# Patient Record
Sex: MALE | Race: BLACK OR AFRICAN AMERICAN | NOT HISPANIC OR LATINO | Employment: FULL TIME | URBAN - METROPOLITAN AREA
[De-identification: names, ages, dates, MRNs, and addresses within clinical notes are randomized per-mention and may not be internally consistent; named-entity substitution may affect disease eponyms.]

---

## 2019-01-02 ENCOUNTER — HOSPITAL ENCOUNTER (INPATIENT)
Facility: HOSPITAL | Age: 24
LOS: 9 days | Discharge: HOME | DRG: 558 | End: 2019-01-11
Attending: EMERGENCY MEDICINE | Admitting: EMERGENCY MEDICINE
Payer: OTHER GOVERNMENT

## 2019-01-02 DIAGNOSIS — M62.82 NON-TRAUMATIC RHABDOMYOLYSIS: Primary | ICD-10-CM

## 2019-01-02 DIAGNOSIS — M79.10 MYALGIA: ICD-10-CM

## 2019-01-02 LAB
ALBUMIN SERPL BCP-MCNC: 4.4 G/DL
ALP SERPL-CCNC: 69 U/L
ALT SERPL W/O P-5'-P-CCNC: 457 U/L
AMPHET+METHAMPHET UR QL: NEGATIVE
ANION GAP SERPL CALC-SCNC: 10 MMOL/L
APAP SERPL-MCNC: <3 UG/ML
AST SERPL-CCNC: 1770 U/L
BACTERIA #/AREA URNS HPF: NORMAL /HPF
BARBITURATES UR QL SCN>200 NG/ML: NEGATIVE
BASOPHILS # BLD AUTO: 0.01 K/UL
BASOPHILS NFR BLD: 0.1 %
BENZODIAZ UR QL SCN>200 NG/ML: NEGATIVE
BILIRUB SERPL-MCNC: 0.6 MG/DL
BILIRUB UR QL STRIP: NEGATIVE
BUN SERPL-MCNC: 23 MG/DL
BZE UR QL SCN: NEGATIVE
CALCIUM SERPL-MCNC: 9.9 MG/DL
CANNABINOIDS UR QL SCN: NEGATIVE
CHLORIDE SERPL-SCNC: 102 MMOL/L
CK SERPL-CCNC: ABNORMAL U/L
CLARITY UR: CLEAR
CO2 SERPL-SCNC: 27 MMOL/L
COLOR UR: ABNORMAL
CREAT SERPL-MCNC: 1.2 MG/DL
CREAT UR-MCNC: 155.6 MG/DL
DIFFERENTIAL METHOD: NORMAL
EOSINOPHIL # BLD AUTO: 0.3 K/UL
EOSINOPHIL NFR BLD: 3.3 %
ERYTHROCYTE [DISTWIDTH] IN BLOOD BY AUTOMATED COUNT: 12.4 %
EST. GFR  (AFRICAN AMERICAN): >60 ML/MIN/1.73 M^2
EST. GFR  (NON AFRICAN AMERICAN): >60 ML/MIN/1.73 M^2
GLUCOSE SERPL-MCNC: 89 MG/DL
GLUCOSE UR QL STRIP: NEGATIVE
HCT VFR BLD AUTO: 40.6 %
HGB BLD-MCNC: 14.1 G/DL
HGB UR QL STRIP: ABNORMAL
HYALINE CASTS #/AREA URNS LPF: 0 /LPF
KETONES UR QL STRIP: ABNORMAL
LEUKOCYTE ESTERASE UR QL STRIP: NEGATIVE
LYMPHOCYTES # BLD AUTO: 2.5 K/UL
LYMPHOCYTES NFR BLD: 25.9 %
MCH RBC QN AUTO: 29.6 PG
MCHC RBC AUTO-ENTMCNC: 34.7 G/DL
MCV RBC AUTO: 85 FL
METHADONE UR QL SCN>300 NG/ML: NEGATIVE
MICROSCOPIC COMMENT: NORMAL
MONOCYTES # BLD AUTO: 0.7 K/UL
MONOCYTES NFR BLD: 7 %
NEUTROPHILS # BLD AUTO: 6.1 K/UL
NEUTROPHILS NFR BLD: 63.7 %
NITRITE UR QL STRIP: NEGATIVE
OPIATES UR QL SCN: NEGATIVE
PCP UR QL SCN>25 NG/ML: NEGATIVE
PH UR STRIP: 6 [PH] (ref 5–8)
PLATELET # BLD AUTO: 194 K/UL
PMV BLD AUTO: 9.8 FL
POTASSIUM SERPL-SCNC: 4 MMOL/L
PROT SERPL-MCNC: 7.9 G/DL
PROT UR QL STRIP: ABNORMAL
RBC # BLD AUTO: 4.77 M/UL
RBC #/AREA URNS HPF: 3 /HPF (ref 0–4)
SODIUM SERPL-SCNC: 139 MMOL/L
SP GR UR STRIP: 1.02 (ref 1–1.03)
SQUAMOUS #/AREA URNS HPF: 3 /HPF
TOXICOLOGY INFORMATION: NORMAL
URN SPEC COLLECT METH UR: ABNORMAL
UROBILINOGEN UR STRIP-ACNC: NEGATIVE EU/DL
WBC # BLD AUTO: 9.58 K/UL
WBC #/AREA URNS HPF: 1 /HPF (ref 0–5)

## 2019-01-02 PROCEDURE — 80307 DRUG TEST PRSMV CHEM ANLYZR: CPT

## 2019-01-02 PROCEDURE — 80074 ACUTE HEPATITIS PANEL: CPT

## 2019-01-02 PROCEDURE — 96375 TX/PRO/DX INJ NEW DRUG ADDON: CPT

## 2019-01-02 PROCEDURE — 99285 EMERGENCY DEPT VISIT HI MDM: CPT

## 2019-01-02 PROCEDURE — 80053 COMPREHEN METABOLIC PANEL: CPT

## 2019-01-02 PROCEDURE — 63600175 PHARM REV CODE 636 W HCPCS: Performed by: NURSE PRACTITIONER

## 2019-01-02 PROCEDURE — 96361 HYDRATE IV INFUSION ADD-ON: CPT

## 2019-01-02 PROCEDURE — 25000003 PHARM REV CODE 250: Performed by: NURSE PRACTITIONER

## 2019-01-02 PROCEDURE — 80329 ANALGESICS NON-OPIOID 1 OR 2: CPT

## 2019-01-02 PROCEDURE — 96365 THER/PROPH/DIAG IV INF INIT: CPT

## 2019-01-02 PROCEDURE — 82550 ASSAY OF CK (CPK): CPT

## 2019-01-02 PROCEDURE — 11000001 HC ACUTE MED/SURG PRIVATE ROOM

## 2019-01-02 PROCEDURE — 81000 URINALYSIS NONAUTO W/SCOPE: CPT | Mod: 59

## 2019-01-02 PROCEDURE — 85025 COMPLETE CBC W/AUTO DIFF WBC: CPT

## 2019-01-02 RX ORDER — MORPHINE SULFATE 10 MG/ML
4 INJECTION INTRAMUSCULAR; INTRAVENOUS; SUBCUTANEOUS
Status: COMPLETED | OUTPATIENT
Start: 2019-01-02 | End: 2019-01-02

## 2019-01-02 RX ORDER — MORPHINE SULFATE 10 MG/ML
4 INJECTION INTRAMUSCULAR; INTRAVENOUS; SUBCUTANEOUS EVERY 4 HOURS PRN
Status: DISCONTINUED | OUTPATIENT
Start: 2019-01-02 | End: 2019-01-03

## 2019-01-02 RX ORDER — SODIUM CHLORIDE 0.9 % (FLUSH) 0.9 %
5 SYRINGE (ML) INJECTION
Status: DISCONTINUED | OUTPATIENT
Start: 2019-01-02 | End: 2019-01-03

## 2019-01-02 RX ORDER — ONDANSETRON 2 MG/ML
4 INJECTION INTRAMUSCULAR; INTRAVENOUS EVERY 8 HOURS PRN
Status: DISCONTINUED | OUTPATIENT
Start: 2019-01-02 | End: 2019-01-03

## 2019-01-02 RX ORDER — SODIUM CHLORIDE 9 MG/ML
1000 INJECTION, SOLUTION INTRAVENOUS CONTINUOUS
Status: ACTIVE | OUTPATIENT
Start: 2019-01-02 | End: 2019-01-03

## 2019-01-02 RX ADMIN — SODIUM CHLORIDE 1000 ML: 0.9 INJECTION, SOLUTION INTRAVENOUS at 01:01

## 2019-01-02 RX ADMIN — SODIUM CHLORIDE 1000 ML: 0.9 INJECTION, SOLUTION INTRAVENOUS at 03:01

## 2019-01-02 RX ADMIN — MORPHINE SULFATE 4 MG: 10 INJECTION INTRAVENOUS at 02:01

## 2019-01-02 RX ADMIN — SODIUM CHLORIDE 1000 ML: 0.9 INJECTION, SOLUTION INTRAVENOUS at 07:01

## 2019-01-02 RX ADMIN — ACETYLCYSTEINE 3200 MG: 200 INJECTION INTRAVENOUS at 04:01

## 2019-01-02 RX ADMIN — ACETYLCYSTEINE 6500 MG: 200 INJECTION INTRAVENOUS at 09:01

## 2019-01-02 RX ADMIN — ACETYLCYSTEINE 9700 MG: 200 INJECTION INTRAVENOUS at 03:01

## 2019-01-02 NOTE — ED TRIAGE NOTES
Patient reports blood in urine this morning. Denies dysuria, fever, n/v/d. Also reports soreness to all extremities that started on yesterday. Denies taking anything for the symptoms.

## 2019-01-02 NOTE — ED PROVIDER NOTES
"Encounter Date: 1/2/2019    SCRIBE #1 NOTE: I, Breezy Bryantmary, am scribing for, and in the presence of,  Beatrice Wolf NP. I have scribed the following portions of the note - Other sections scribed: HPI, ROS.       History     Chief Complaint   Patient presents with    Hematuria     "I woke up and I had blood in my urine. I went to medical and they said it's possibly a kidney stone."      CC: Hematuria    HPI: This is a 23 y.o. M who has no PMHx who presents to the ED for emergent evaluation of acute hematuria that began today. Pt reports noticing dark appearing urine upon waking this morning. [place] and [place] informed of blood in urine. [place] was concerned for possible kidney stone and pt was instructed to report to the ED. Pt has an additional complaint of acute muscle aches in both arms, pectoral muscles, and both calves. He states that he worked out 2 days ago and went for a run yesterday morning. He was uanble to complete the run due to the muscle aches. Pt's muscle aches are exacerbated with movement. He does not have a Hx of similar problem. Pt denies fever, chills, CP, abdominal pain, nausea, vomiting, or diarrhea.  Denies travel outside the country.  No prior medications were taken to treat his pain.  He does report starting a new whey powder to supplement his workouts.  He also reports drinking on New Year's va but is otherwise only a social drinker, with 1 drink/week on average. He denies illicit drug use.      The history is provided by the patient. No  was used.     Review of patient's allergies indicates:  No Known Allergies  History reviewed. No pertinent past medical history.  History reviewed. No pertinent surgical history.  History reviewed. No pertinent family history.  Social History     Tobacco Use    Smoking status: Never Smoker   Substance Use Topics    Alcohol use: Yes     Comment: socially    Drug use: No "     Review of Systems   Constitutional: Negative for chills and fever.   HENT: Negative for ear pain and sore throat.    Eyes: Negative for pain.   Respiratory: Negative for cough and shortness of breath.    Cardiovascular: Negative for chest pain.   Gastrointestinal: Negative for abdominal pain, diarrhea, nausea and vomiting.   Genitourinary: Positive for hematuria. Negative for dysuria.   Musculoskeletal: Positive for myalgias. Negative for back pain.   Skin: Negative for rash.   Neurological: Negative for headaches.       Physical Exam     Initial Vitals [01/02/19 1303]   BP Pulse Resp Temp SpO2   120/71 72 18 98.4 °F (36.9 °C) 98 %      MAP       --         Physical Exam    Nursing note and vitals reviewed.  Constitutional: Vital signs are normal. He appears well-developed and well-nourished. He is not diaphoretic. He is active and cooperative. He does not appear ill. No distress.   Pleasant. Non-distressed.   Eyes: Pupils are equal, round, and reactive to light.   Neck: Normal range of motion.   Cardiovascular: S1 normal and normal heart sounds.   Pulmonary/Chest: Effort normal and breath sounds normal. No tachypnea and no bradypnea. No respiratory distress.   Abdominal: Soft. Normal appearance. There is no tenderness. There is no rigidity, no rebound, no guarding and no CVA tenderness.   Neurological: He is alert and oriented to person, place, and time. No sensory deficit.   Skin: Skin is warm and dry.   Psychiatric: He has a normal mood and affect.         ED Course   Procedures  Labs Reviewed   URINALYSIS, REFLEX TO URINE CULTURE - Abnormal; Notable for the following components:       Result Value    Protein, UA 2+ (*)     Ketones, UA 1+ (*)     Occult Blood UA 3+ (*)     All other components within normal limits    Narrative:     Preferred Collection Type->Urine, Clean Catch   COMPREHENSIVE METABOLIC PANEL - Abnormal; Notable for the following components:    BUN, Bld 23 (*)     AST 1,770 (*)      (*)      All other components within normal limits   CK - Abnormal; Notable for the following components:    CPK >93978 (*)     All other components within normal limits   ACETAMINOPHEN LEVEL - Abnormal; Notable for the following components:    Acetaminophen (Tylenol), Serum <3.0 (*)     All other components within normal limits   CBC W/ AUTO DIFFERENTIAL   URINALYSIS MICROSCOPIC    Narrative:     Preferred Collection Type->Urine, Clean Catch   HEPATITIS PANEL, ACUTE   DRUG SCREEN PANEL, URINE EMERGENCY          Imaging Results    None             Additional MDM:   Comments: This is an urgent evaluation of a 23-year-old male that presents the emergency room with myalgias and dark appearing urine this morning.  Patient reports that myalgias started yesterday afternoon and progressively have worsened.  He has associated muscle stiffness in both arms, the calves, and his pectoral muscles.  He denies abdominal pain, chest pain, other GI or  symptoms.  He does routinely work out since he is in the , most recently had a workout 2 days ago and a short run yesterday.  CPK today was >40K.  He also had a slightly elevated BUN (23) and transaminitis (AST 1770/). Acetaminophen level < 3. Hepatitis panel and drug screen are in progress.  He does admit to drinking some tequila for New Years Annette, but is otherwise a social/occasional drinker (one drink per week).  Pt received 2 liters of NS in the ED and acetadote.  Will admit to hospital medicine for IV hydration and repeat labs.  Case discussed with .  .          Scribe Attestation:   Scribe #1: I performed the above scribed service and the documentation accurately describes the services I performed. I attest to the accuracy of the note.    Attending Attestation:           Physician Attestation for Scribe:  Physician Attestation Statement for Scribe #1: I, Beatrice Wolf NP, reviewed documentation, as scribed by Breezy Cameron in my presence, and it is both  accurate and complete.                    Clinical Impression:   The primary encounter diagnosis was Non-traumatic rhabdomyolysis. A diagnosis of Myalgia was also pertinent to this visit.      Disposition:   Disposition: Discharged  Condition: Stable                        Beatrice Wolf NP  01/02/19 1642       Beatrice Wolf NP  01/02/19 3926

## 2019-01-03 LAB
ALBUMIN SERPL BCP-MCNC: 3.2 G/DL
ALP SERPL-CCNC: 57 U/L
ALT SERPL W/O P-5'-P-CCNC: 530 U/L
ANION GAP SERPL CALC-SCNC: 6 MMOL/L
AST SERPL-CCNC: 1660 U/L
BASOPHILS # BLD AUTO: 0.01 K/UL
BASOPHILS NFR BLD: 0.2 %
BILIRUB SERPL-MCNC: 0.6 MG/DL
BUN SERPL-MCNC: 11 MG/DL
CALCIUM SERPL-MCNC: 8.8 MG/DL
CHLORIDE SERPL-SCNC: 108 MMOL/L
CO2 SERPL-SCNC: 26 MMOL/L
CREAT SERPL-MCNC: 0.9 MG/DL
DIFFERENTIAL METHOD: ABNORMAL
EOSINOPHIL # BLD AUTO: 0.3 K/UL
EOSINOPHIL NFR BLD: 5 %
ERYTHROCYTE [DISTWIDTH] IN BLOOD BY AUTOMATED COUNT: 12.5 %
EST. GFR  (AFRICAN AMERICAN): >60 ML/MIN/1.73 M^2
EST. GFR  (NON AFRICAN AMERICAN): >60 ML/MIN/1.73 M^2
GGT SERPL-CCNC: 30 U/L
GLUCOSE SERPL-MCNC: 91 MG/DL
HAV IGM SERPL QL IA: NEGATIVE
HBV CORE IGM SERPL QL IA: NEGATIVE
HBV SURFACE AG SERPL QL IA: NEGATIVE
HCT VFR BLD AUTO: 37 %
HCV AB SERPL QL IA: NEGATIVE
HGB BLD-MCNC: 12.8 G/DL
LYMPHOCYTES # BLD AUTO: 1.9 K/UL
LYMPHOCYTES NFR BLD: 28.7 %
MAGNESIUM SERPL-MCNC: 2.1 MG/DL
MCH RBC QN AUTO: 29.8 PG
MCHC RBC AUTO-ENTMCNC: 34.6 G/DL
MCV RBC AUTO: 86 FL
MONOCYTES # BLD AUTO: 0.6 K/UL
MONOCYTES NFR BLD: 8.6 %
NEUTROPHILS # BLD AUTO: 3.8 K/UL
NEUTROPHILS NFR BLD: 57.5 %
PHOSPHATE SERPL-MCNC: 3.5 MG/DL
PLATELET # BLD AUTO: 169 K/UL
PMV BLD AUTO: 9.8 FL
POTASSIUM SERPL-SCNC: 3.9 MMOL/L
PROT SERPL-MCNC: 6.2 G/DL
RBC # BLD AUTO: 4.3 M/UL
SODIUM SERPL-SCNC: 140 MMOL/L
WBC # BLD AUTO: 6.54 K/UL

## 2019-01-03 PROCEDURE — 80053 COMPREHEN METABOLIC PANEL: CPT

## 2019-01-03 PROCEDURE — 11000001 HC ACUTE MED/SURG PRIVATE ROOM

## 2019-01-03 PROCEDURE — 94761 N-INVAS EAR/PLS OXIMETRY MLT: CPT

## 2019-01-03 PROCEDURE — 84100 ASSAY OF PHOSPHORUS: CPT

## 2019-01-03 PROCEDURE — 25000003 PHARM REV CODE 250: Performed by: HOSPITALIST

## 2019-01-03 PROCEDURE — 25000003 PHARM REV CODE 250: Performed by: INTERNAL MEDICINE

## 2019-01-03 PROCEDURE — 83735 ASSAY OF MAGNESIUM: CPT

## 2019-01-03 PROCEDURE — 36415 COLL VENOUS BLD VENIPUNCTURE: CPT

## 2019-01-03 PROCEDURE — 82977 ASSAY OF GGT: CPT

## 2019-01-03 PROCEDURE — 85025 COMPLETE CBC W/AUTO DIFF WBC: CPT

## 2019-01-03 RX ORDER — ONDANSETRON 2 MG/ML
8 INJECTION INTRAMUSCULAR; INTRAVENOUS EVERY 6 HOURS PRN
Status: DISCONTINUED | OUTPATIENT
Start: 2019-01-03 | End: 2019-01-11 | Stop reason: HOSPADM

## 2019-01-03 RX ORDER — AMOXICILLIN 250 MG
1 CAPSULE ORAL 2 TIMES DAILY PRN
Status: DISCONTINUED | OUTPATIENT
Start: 2019-01-03 | End: 2019-01-11 | Stop reason: HOSPADM

## 2019-01-03 RX ORDER — OXYCODONE HYDROCHLORIDE 5 MG/1
5 TABLET ORAL EVERY 4 HOURS PRN
Status: DISCONTINUED | OUTPATIENT
Start: 2019-01-03 | End: 2019-01-11 | Stop reason: HOSPADM

## 2019-01-03 RX ORDER — RAMELTEON 8 MG/1
8 TABLET ORAL NIGHTLY PRN
Status: DISCONTINUED | OUTPATIENT
Start: 2019-01-03 | End: 2019-01-11 | Stop reason: HOSPADM

## 2019-01-03 RX ORDER — SODIUM CHLORIDE 9 MG/ML
INJECTION, SOLUTION INTRAVENOUS CONTINUOUS
Status: ACTIVE | OUTPATIENT
Start: 2019-01-03 | End: 2019-01-04

## 2019-01-03 RX ORDER — ACETAMINOPHEN 325 MG/1
650 TABLET ORAL EVERY 4 HOURS PRN
Status: DISCONTINUED | OUTPATIENT
Start: 2019-01-03 | End: 2019-01-11 | Stop reason: HOSPADM

## 2019-01-03 RX ORDER — ACETAMINOPHEN 500 MG
500 TABLET ORAL EVERY 6 HOURS PRN
Status: DISCONTINUED | OUTPATIENT
Start: 2019-01-03 | End: 2019-01-03

## 2019-01-03 RX ORDER — ONDANSETRON 2 MG/ML
8 INJECTION INTRAMUSCULAR; INTRAVENOUS EVERY 8 HOURS PRN
Status: DISCONTINUED | OUTPATIENT
Start: 2019-01-03 | End: 2019-01-03

## 2019-01-03 RX ADMIN — SODIUM CHLORIDE: 0.9 INJECTION, SOLUTION INTRAVENOUS at 04:01

## 2019-01-03 RX ADMIN — SODIUM CHLORIDE: 0.9 INJECTION, SOLUTION INTRAVENOUS at 11:01

## 2019-01-03 RX ADMIN — OXYCODONE HYDROCHLORIDE 5 MG: 5 TABLET ORAL at 05:01

## 2019-01-03 RX ADMIN — SODIUM CHLORIDE: 0.9 INJECTION, SOLUTION INTRAVENOUS at 02:01

## 2019-01-03 RX ADMIN — SODIUM CHLORIDE: 0.9 INJECTION, SOLUTION INTRAVENOUS at 07:01

## 2019-01-03 NOTE — HPI
Mr. Edwin Rodriguez is a 23 y.o. male with no medical history who presents to [facility] ED with complaints of hematuria today.  He woke up this morning and noticed that his urine had some blood in there.  [place] went [facility] [place] and was recommended to come to the ED for evaluation.  He does say that his proximal arm and distal leg muscles have been very sore and [place]  He reports running a couple days ago and had a light jog (200 meters) yesterday.  He did some exercise this morning around 5:00 AM but otherwise did not do anything out of the ordinary for him.  He generally does not have any exercise intolerance.  He denies any fevers, chills, nausea, vomiting, abdominal pain, dysuria, hematochezia, nor melena.  He does take vitamin supplements.

## 2019-01-03 NOTE — H&P
[facility] Hospital Medicine  History & Physical    Patient Name: Edwin Rodriguez  MRN: [id number]  Admission Date: 1/2/2019  Attending Physician: Delmy Ernandez MD   Primary Care Provider: Primary Doctor No         Patient information was obtained from patient.     Subjective:     Principal Problem:Non-traumatic rhabdomyolysis    Chief Complaint: Bloody urine today.    HPI: Mr. Edwin Rodriguez is a 23 y.o. male with no medical history who presents to [facility] ED with complaints of hematuria today.  He woke up this morning and noticed that his urine had some blood in there.  He went to the [facility] on [place] and was recommended to come to the ED for evaluation.  He does say that his proximal arm and distal leg muscles have been very sore and [place]  He reports running a couple days ago and had a light jog (200 meters) yesterday.  He did some exercise this morning around 5:00 AM but otherwise did not do anything out of the ordinary for him.  He generally does not have any exercise intolerance.  He denies any fevers, chills, nausea, vomiting, abdominal pain, dysuria, hematochezia, nor melena.  He does take vitamin supplements.    Chart Review:  Patient has not had any recent hospitalizations or outpatient clinic visits within the system.    Past Medical History:   Diagnosis Date    Rhabdomyolysis 01/02/20.19    nontraumatic       Past Surgical History:   Procedure Laterality Date    NO PAST SURGERIES  01/02/2019       Review of patient's allergies indicates:  No Known Allergies    No current facility-administered medications on file prior to encounter.      No current outpatient medications on file prior to encounter.     Family History     None        Tobacco Use    Smoking status: Never Smoker    Smokeless tobacco: Never Used   Substance and Sexual Activity    Alcohol use: Yes     Comment: socially    Drug use: No    Sexual activity: Yes     Partners: Female      Review of Systems   Constitutional: Positive for activity change. Negative for appetite change, chills, diaphoresis, fatigue, fever and unexpected weight change.   HENT: Negative.    Eyes: Negative.    Respiratory: Negative for cough, chest tightness, shortness of breath and wheezing.    Cardiovascular: Negative for chest pain, palpitations and leg swelling.   Gastrointestinal: Negative for abdominal distention, abdominal pain, blood in stool, constipation, diarrhea, nausea and vomiting.   Genitourinary: Positive for hematuria. Negative for dysuria, frequency and urgency.   Musculoskeletal:        Muscle soreness in his biceps and calves   Skin: Negative.    Neurological: Negative for dizziness, seizures, syncope, weakness and light-headedness.   Psychiatric/Behavioral: Negative.      Objective:     Vital Signs (Most Recent):  Temp: 98.4 °F (36.9 °C) (01/02/19 2313)  Pulse: 75 (01/02/19 2313)  Resp: 18 (01/02/19 2313)  BP: 119/65 (01/02/19 2313)  SpO2: 97 % (01/02/19 2313) Vital Signs (24h Range):  Temp:  [98.1 °F (36.7 °C)-98.6 °F (37 °C)] 98.4 °F (36.9 °C)  Pulse:  [64-75] 75  Resp:  [18-20] 18  SpO2:  [97 %-99 %] 97 %  BP: (114-134)/(65-74) 119/65     Weight: 64.6 kg (142 lb 6.4 oz)  Body mass index is 19.31 kg/m².    Physical Exam   Constitutional: He is oriented to person, place, and time. He appears well-developed and well-nourished. No distress.   HENT:   Head: Normocephalic and atraumatic.   Right Ear: External ear normal.   Left Ear: External ear normal.   Nose: Nose normal.   Eyes: Right eye exhibits no discharge. Left eye exhibits no discharge.   Neck: Normal range of motion.   Cardiovascular: Normal rate, regular rhythm, normal heart sounds and intact distal pulses. Exam reveals no gallop and no friction rub.   No murmur heard.  Pulmonary/Chest: Effort normal and breath sounds normal. No stridor. No respiratory distress. He has no wheezes. He has no rales. He exhibits no tenderness.   Abdominal:  Soft. Bowel sounds are normal. He exhibits no distension. There is no tenderness. There is no rebound and no guarding.   Musculoskeletal: Normal range of motion. He exhibits no edema.   Neurological: He is alert and oriented to person, place, and time.   Skin: Skin is warm and dry. He is not diaphoretic. No erythema.   Psychiatric: He has a normal mood and affect. His behavior is normal. Judgment and thought content normal.   Nursing note and vitals reviewed.          Significant Labs: All pertinent labs within the past 24 hours have been reviewed.    Significant Imaging: I have reviewed and interpreted all pertinent imaging results/findings within the past 24 hours.    Assessment/Plan:     * Non-traumatic rhabdomyolysis    Patient does report recent strenuous work-outs and does have laboratory evidence of rhabdomyolysis.  He has a CPK > 40K along with a dipstick with 3+ occult blood but a urine microscopy that is normal.  Of note, he does have an elevation in his AST/ALT of 1770/457.  Will start aggressive IV fluids and obtain an acute hepatitis panel in the morning.  He is otherwise doing well.       VTE Risk Mitigation (From admission, onward)        Ordered     IP VTE LOW RISK PATIENT  Once      01/02/19 1846           Quita Gallagher M.D.  [facility]  Department of Hospital Medicine  [facility]  Pager: [phone]

## 2019-01-03 NOTE — CARE UPDATE
Patient seen and examined.  Agree with Dr. Gallagher's treatment and plan.  Rhabdo secondary to strenuous exercise.  Started on aggressive IVF hydration.  Elevated LFT's probably secondary to rhabdo and not liver injury.  Continue IVF's and repeat CMP and CPK in AM.  Symptoms improving.

## 2019-01-03 NOTE — PLAN OF CARE
Problem: Adult Inpatient Plan of Care  Goal: Plan of Care Review  Outcome: Ongoing (interventions implemented as appropriate)   01/03/19 8461   Plan of Care Review   Plan of Care Reviewed With patient   Patient free from fall or injury. Patient able to verbalize needs. IVF infusing as ordered. VSS. Safety maintained will continue to monitor.

## 2019-01-03 NOTE — ASSESSMENT & PLAN NOTE
Patient does report recent strenuous work-outs and does have laboratory evidence of rhabdomyolysis.  He has a CPK > 40K along with a dipstick with 3+ occult blood but a urine microscopy that is normal.  Of note, he does have an elevation in his AST/ALT of 1770/457.  Will start aggressive IV fluids and obtain an acute hepatitis panel in the morning.  He is otherwise doing well.

## 2019-01-03 NOTE — SUBJECTIVE & OBJECTIVE
Past Medical History:   Diagnosis Date    Rhabdomyolysis 01/02/20.19    nontraumatic       Past Surgical History:   Procedure Laterality Date    NO PAST SURGERIES  01/02/2019       Review of patient's allergies indicates:  No Known Allergies    No current facility-administered medications on file prior to encounter.      No current outpatient medications on file prior to encounter.     Family History     None        Tobacco Use    Smoking status: Never Smoker    Smokeless tobacco: Never Used   Substance and Sexual Activity    Alcohol use: Yes     Comment: socially    Drug use: No    Sexual activity: Yes     Partners: Female     Review of Systems   Constitutional: Positive for activity change. Negative for appetite change, chills, diaphoresis, fatigue, fever and unexpected weight change.   HENT: Negative.    Eyes: Negative.    Respiratory: Negative for cough, chest tightness, shortness of breath and wheezing.    Cardiovascular: Negative for chest pain, palpitations and leg swelling.   Gastrointestinal: Negative for abdominal distention, abdominal pain, blood in stool, constipation, diarrhea, nausea and vomiting.   Genitourinary: Positive for hematuria. Negative for dysuria, frequency and urgency.   Musculoskeletal:        Muscle soreness in his biceps and calves   Skin: Negative.    Neurological: Negative for dizziness, seizures, syncope, weakness and light-headedness.   Psychiatric/Behavioral: Negative.      Objective:     Vital Signs (Most Recent):  Temp: 98.4 °F (36.9 °C) (01/02/19 2313)  Pulse: 75 (01/02/19 2313)  Resp: 18 (01/02/19 2313)  BP: 119/65 (01/02/19 2313)  SpO2: 97 % (01/02/19 2313) Vital Signs (24h Range):  Temp:  [98.1 °F (36.7 °C)-98.6 °F (37 °C)] 98.4 °F (36.9 °C)  Pulse:  [64-75] 75  Resp:  [18-20] 18  SpO2:  [97 %-99 %] 97 %  BP: (114-134)/(65-74) 119/65     Weight: 64.6 kg (142 lb 6.4 oz)  Body mass index is 19.31 kg/m².    Physical Exam   Constitutional: He is oriented to person, place,  and time. He appears well-developed and well-nourished. No distress.   HENT:   Head: Normocephalic and atraumatic.   Right Ear: External ear normal.   Left Ear: External ear normal.   Nose: Nose normal.   Eyes: Right eye exhibits no discharge. Left eye exhibits no discharge.   Neck: Normal range of motion.   Cardiovascular: Normal rate, regular rhythm, normal heart sounds and intact distal pulses. Exam reveals no gallop and no friction rub.   No murmur heard.  Pulmonary/Chest: Effort normal and breath sounds normal. No stridor. No respiratory distress. He has no wheezes. He has no rales. He exhibits no tenderness.   Abdominal: Soft. Bowel sounds are normal. He exhibits no distension. There is no tenderness. There is no rebound and no guarding.   Musculoskeletal: Normal range of motion. He exhibits no edema.   Neurological: He is alert and oriented to person, place, and time.   Skin: Skin is warm and dry. He is not diaphoretic. No erythema.   Psychiatric: He has a normal mood and affect. His behavior is normal. Judgment and thought content normal.   Nursing note and vitals reviewed.          Significant Labs: All pertinent labs within the past 24 hours have been reviewed.    Significant Imaging: I have reviewed and interpreted all pertinent imaging results/findings within the past 24 hours.

## 2019-01-04 LAB
ALBUMIN SERPL BCP-MCNC: 3.3 G/DL
ALP SERPL-CCNC: 61 U/L
ALT SERPL W/O P-5'-P-CCNC: 693 U/L
ANION GAP SERPL CALC-SCNC: 5 MMOL/L
AST SERPL-CCNC: 1852 U/L
BILIRUB SERPL-MCNC: 0.4 MG/DL
BUN SERPL-MCNC: 8 MG/DL
CALCIUM SERPL-MCNC: 9 MG/DL
CHLORIDE SERPL-SCNC: 106 MMOL/L
CK SERPL-CCNC: ABNORMAL U/L
CO2 SERPL-SCNC: 28 MMOL/L
CREAT SERPL-MCNC: 0.9 MG/DL
EST. GFR  (AFRICAN AMERICAN): >60 ML/MIN/1.73 M^2
EST. GFR  (NON AFRICAN AMERICAN): >60 ML/MIN/1.73 M^2
GLUCOSE SERPL-MCNC: 83 MG/DL
POTASSIUM SERPL-SCNC: 3.9 MMOL/L
PROT SERPL-MCNC: 6.2 G/DL
SODIUM SERPL-SCNC: 139 MMOL/L

## 2019-01-04 PROCEDURE — 36415 COLL VENOUS BLD VENIPUNCTURE: CPT

## 2019-01-04 PROCEDURE — 82550 ASSAY OF CK (CPK): CPT

## 2019-01-04 PROCEDURE — 25000003 PHARM REV CODE 250: Performed by: HOSPITALIST

## 2019-01-04 PROCEDURE — 11000001 HC ACUTE MED/SURG PRIVATE ROOM

## 2019-01-04 PROCEDURE — 25000003 PHARM REV CODE 250: Performed by: INTERNAL MEDICINE

## 2019-01-04 PROCEDURE — 80053 COMPREHEN METABOLIC PANEL: CPT

## 2019-01-04 RX ORDER — SODIUM CHLORIDE 9 MG/ML
INJECTION, SOLUTION INTRAVENOUS CONTINUOUS
Status: DISCONTINUED | OUTPATIENT
Start: 2019-01-04 | End: 2019-01-09

## 2019-01-04 RX ORDER — SODIUM CHLORIDE 9 MG/ML
INJECTION, SOLUTION INTRAVENOUS CONTINUOUS
Status: DISCONTINUED | OUTPATIENT
Start: 2019-01-04 | End: 2019-01-04

## 2019-01-04 RX ADMIN — OXYCODONE HYDROCHLORIDE 5 MG: 5 TABLET ORAL at 01:01

## 2019-01-04 RX ADMIN — SODIUM CHLORIDE: 0.9 INJECTION, SOLUTION INTRAVENOUS at 08:01

## 2019-01-04 RX ADMIN — SODIUM CHLORIDE: 0.9 INJECTION, SOLUTION INTRAVENOUS at 01:01

## 2019-01-04 NOTE — PLAN OF CARE
Problem: Adult Inpatient Plan of Care  Goal: Plan of Care Review  Outcome: Ongoing (interventions implemented as appropriate)  Pt free of accidental injury during shift. No s/s of distress noted. Denies pain at present. Able to make needs known. IVF infusing as ordered. Safety measures maintained. Call bell within reach. Will continue to monitor

## 2019-01-04 NOTE — PLAN OF CARE
Problem: Adult Inpatient Plan of Care  Goal: Plan of Care Review  Outcome: Ongoing (interventions implemented as appropriate)  Resting well. IV fluids administered as ordered. Urine still kemal. Adequate output. Denies SOB. One time complaint of pain adequately relieved with PRN medication. Remains free from falls or trauma.

## 2019-01-04 NOTE — PLAN OF CARE
Problem: Adult Inpatient Plan of Care  Goal: Plan of Care Review  Monitored freq.throughout the shift. Pt.resting at present with no complaints and no acute distress noted. Iv fluids continue in use. Med.for pian & med.helpful. Call light in reach.

## 2019-01-05 LAB
ALBUMIN SERPL BCP-MCNC: 3.2 G/DL
ALP SERPL-CCNC: 59 U/L
ALT SERPL W/O P-5'-P-CCNC: 697 U/L
ANION GAP SERPL CALC-SCNC: 6 MMOL/L
AST SERPL-CCNC: 1493 U/L
BILIRUB SERPL-MCNC: 0.4 MG/DL
BUN SERPL-MCNC: 10 MG/DL
CALCIUM SERPL-MCNC: 8.9 MG/DL
CHLORIDE SERPL-SCNC: 108 MMOL/L
CK SERPL-CCNC: ABNORMAL U/L
CO2 SERPL-SCNC: 27 MMOL/L
CREAT SERPL-MCNC: 0.8 MG/DL
EST. GFR  (AFRICAN AMERICAN): >60 ML/MIN/1.73 M^2
EST. GFR  (NON AFRICAN AMERICAN): >60 ML/MIN/1.73 M^2
GLUCOSE SERPL-MCNC: 77 MG/DL
POTASSIUM SERPL-SCNC: 4.1 MMOL/L
PROT SERPL-MCNC: 6.1 G/DL
SODIUM SERPL-SCNC: 141 MMOL/L

## 2019-01-05 PROCEDURE — 82550 ASSAY OF CK (CPK): CPT

## 2019-01-05 PROCEDURE — 25000003 PHARM REV CODE 250: Performed by: HOSPITALIST

## 2019-01-05 PROCEDURE — 36415 COLL VENOUS BLD VENIPUNCTURE: CPT

## 2019-01-05 PROCEDURE — 11000001 HC ACUTE MED/SURG PRIVATE ROOM

## 2019-01-05 PROCEDURE — 80053 COMPREHEN METABOLIC PANEL: CPT

## 2019-01-05 RX ADMIN — SODIUM CHLORIDE: 0.9 INJECTION, SOLUTION INTRAVENOUS at 11:01

## 2019-01-05 RX ADMIN — SODIUM CHLORIDE: 0.9 INJECTION, SOLUTION INTRAVENOUS at 07:01

## 2019-01-05 RX ADMIN — SODIUM CHLORIDE: 0.9 INJECTION, SOLUTION INTRAVENOUS at 01:01

## 2019-01-05 NOTE — SUBJECTIVE & OBJECTIVE
Interval History: Pt reports feeling better, legs still a little sore but improving. Urinating without difficulty and no reported SOB.    Review of Systems   Constitutional: Negative for chills and fever.   Respiratory: Negative for shortness of breath.    Cardiovascular: Negative for chest pain.   Musculoskeletal: Positive for myalgias.     Objective:     Vital Signs (Most Recent):  Temp: 98.6 °F (37 °C) (01/05/19 0725)  Pulse: 68 (01/05/19 0725)  Resp: 20 (01/05/19 0725)  BP: 122/72 (01/05/19 0725)  SpO2: 99 % (01/05/19 0725) Vital Signs (24h Range):  Temp:  [98.1 °F (36.7 °C)-98.8 °F (37.1 °C)] 98.6 °F (37 °C)  Pulse:  [54-83] 68  Resp:  [18-20] 20  SpO2:  [97 %-100 %] 99 %  BP: (119-139)/(58-83) 122/72     Weight: 62.7 kg (138 lb 4.8 oz)  Body mass index is 18.76 kg/m².    Intake/Output Summary (Last 24 hours) at 1/5/2019 1044  Last data filed at 1/5/2019 0800  Gross per 24 hour   Intake 2230 ml   Output 3950 ml   Net -1720 ml      Physical Exam   Constitutional: He is oriented to person, place, and time. He appears well-developed and well-nourished. No distress.   HENT:   Head: Normocephalic and atraumatic.   Eyes: EOM are normal. Pupils are equal, round, and reactive to light. Right eye exhibits no discharge. Left eye exhibits no discharge.   Neck: Normal range of motion.   Cardiovascular: Normal rate and regular rhythm. Exam reveals no gallop.   Pulmonary/Chest: Effort normal and breath sounds normal.   Abdominal: Soft. Bowel sounds are normal. He exhibits no distension. There is no tenderness. There is no rebound and no guarding.   Musculoskeletal: Normal range of motion. He exhibits no edema.   Neurological: He is alert and oriented to person, place, and time.   Skin: Skin is warm and dry. Capillary refill takes less than 2 seconds. He is not diaphoretic. No erythema.   Psychiatric: He has a normal mood and affect. His behavior is normal. Judgment and thought content normal.   Nursing note and vitals  reviewed.      Significant Labs: All pertinent labs within the past 24 hours have been reviewed.    Significant Imaging: I have reviewed and interpreted all pertinent imaging results/findings within the past 24 hours.

## 2019-01-05 NOTE — SUBJECTIVE & OBJECTIVE
Interval History: Pt reports feeling better, legs still a little sore but no more cramping.    Review of Systems   Constitutional: Negative for chills and fever.   Respiratory: Negative for shortness of breath.    Cardiovascular: Negative for chest pain.   Musculoskeletal: Positive for myalgias.     Objective:     Vital Signs (Most Recent):  Temp: 98.8 °F (37.1 °C) (01/04/19 1946)  Pulse: 66 (01/04/19 1946)  Resp: 18 (01/04/19 1946)  BP: 139/83 (01/04/19 1946)  SpO2: 100 % (01/04/19 1946) Vital Signs (24h Range):  Temp:  [98.1 °F (36.7 °C)-98.8 °F (37.1 °C)] 98.8 °F (37.1 °C)  Pulse:  [56-83] 66  Resp:  [18] 18  SpO2:  [96 %-100 %] 100 %  BP: (100-139)/(56-83) 139/83     Weight: 64.6 kg (142 lb 6.4 oz)  Body mass index is 19.31 kg/m².    Intake/Output Summary (Last 24 hours) at 1/4/2019 2143  Last data filed at 1/4/2019 2030  Gross per 24 hour   Intake 720 ml   Output 3375 ml   Net -2655 ml      Physical Exam   Constitutional: He is oriented to person, place, and time. He appears well-developed and well-nourished. No distress.   HENT:   Head: Normocephalic and atraumatic.   Eyes: EOM are normal. Pupils are equal, round, and reactive to light. Right eye exhibits no discharge. Left eye exhibits no discharge.   Neck: Normal range of motion.   Cardiovascular: Normal rate and regular rhythm. Exam reveals no gallop.   Pulmonary/Chest: Effort normal and breath sounds normal.   Abdominal: Soft. Bowel sounds are normal. He exhibits no distension. There is no tenderness. There is no rebound and no guarding.   Musculoskeletal: Normal range of motion. He exhibits no edema.   Neurological: He is alert and oriented to person, place, and time.   Skin: Skin is warm and dry. Capillary refill takes less than 2 seconds. He is not diaphoretic. No erythema.   Psychiatric: He has a normal mood and affect. His behavior is normal. Judgment and thought content normal.   Nursing note and vitals reviewed.      Significant Labs: All pertinent  labs within the past 24 hours have been reviewed.    Significant Imaging: I have reviewed and interpreted all pertinent imaging results/findings within the past 24 hours.

## 2019-01-05 NOTE — PLAN OF CARE
Problem: Adult Inpatient Plan of Care  Goal: Plan of Care Review  Pt is alert and oriented.  Pt denies any pain this shift.  NS at 250ml/hr in Lt AC.  Pt voids per urinal without difficulty. No s/s of distress noted. No falls/injuries this shift. Call bell within reach.

## 2019-01-05 NOTE — PROGRESS NOTES
[facility] Hospital Medicine  Progress Note    Patient Name: Edwin Rodriguez  MRN: [id number]  Patient Class: IP- Inpatient   Admission Date: 1/2/2019  Length of Stay: 2 days  Attending Physician: Ana Cristina Samayao MD  Primary Care Provider: Primary Doctor No        Subjective:     Principal Problem:Non-traumatic rhabdomyolysis    HPI:  Mr. Edwin Rodriguez is a 23 y.o. male with no medical history who presents to [facility] ED with complaints of hematuria today.  He woke up this morning and noticed that his urine had some blood in there.  [place] [facility] [place] to come to the ED for evaluation.  He does say that his proximal arm and distal leg muscles have been very sore and [place]  He reports running a couple days ago and had a light jog (200 meters) yesterday.  He did some exercise this morning around 5:00 AM but otherwise did not do anything out of the ordinary for him.  He generally does not have any exercise intolerance.  He denies any fevers, chills, nausea, vomiting, abdominal pain, dysuria, hematochezia, nor melena.  He does take vitamin supplements.    Hospital Course:  Mr. Rodriguez was admitted with rhabdomyolysis induced by strenuous activity with CPK level >40K and elevated LFTS. Pt was treated with aggressive IVF and close monitoring of renal function and LFTs, and CPK levels were monitored.    Interval History: Pt reports feeling better, legs still a little sore but no more cramping.    Review of Systems   Constitutional: Negative for chills and fever.   Respiratory: Negative for shortness of breath.    Cardiovascular: Negative for chest pain.   Musculoskeletal: Positive for myalgias.     Objective:     Vital Signs (Most Recent):  Temp: 98.8 °F (37.1 °C) (01/04/19 1946)  Pulse: 66 (01/04/19 1946)  Resp: 18 (01/04/19 1946)  BP: 139/83 (01/04/19 1946)  SpO2: 100 % (01/04/19 1946) Vital Signs (24h Range):  Temp:  [98.1 °F (36.7  °C)-98.8 °F (37.1 °C)] 98.8 °F (37.1 °C)  Pulse:  [56-83] 66  Resp:  [18] 18  SpO2:  [96 %-100 %] 100 %  BP: (100-139)/(56-83) 139/83     Weight: 64.6 kg (142 lb 6.4 oz)  Body mass index is 19.31 kg/m².    Intake/Output Summary (Last 24 hours) at 1/4/2019 2143  Last data filed at 1/4/2019 2030  Gross per 24 hour   Intake 720 ml   Output 3375 ml   Net -2655 ml      Physical Exam   Constitutional: He is oriented to person, place, and time. He appears well-developed and well-nourished. No distress.   HENT:   Head: Normocephalic and atraumatic.   Eyes: EOM are normal. Pupils are equal, round, and reactive to light. Right eye exhibits no discharge. Left eye exhibits no discharge.   Neck: Normal range of motion.   Cardiovascular: Normal rate and regular rhythm. Exam reveals no gallop.   Pulmonary/Chest: Effort normal and breath sounds normal.   Abdominal: Soft. Bowel sounds are normal. He exhibits no distension. There is no tenderness. There is no rebound and no guarding.   Musculoskeletal: Normal range of motion. He exhibits no edema.   Neurological: He is alert and oriented to person, place, and time.   Skin: Skin is warm and dry. Capillary refill takes less than 2 seconds. He is not diaphoretic. No erythema.   Psychiatric: He has a normal mood and affect. His behavior is normal. Judgment and thought content normal.   Nursing note and vitals reviewed.      Significant Labs: All pertinent labs within the past 24 hours have been reviewed.    Significant Imaging: I have reviewed and interpreted all pertinent imaging results/findings within the past 24 hours.    Assessment/Plan:      * Non-traumatic rhabdomyolysis    Patient does report recent strenuous work-outs as likely cause of rhabdomyolysis.    He has a CPK > 40K along with a dipstick with 3+ occult blood.  He an elevation in his AST/ALT of 1770/457 likely caused by rhabdomyolysis  Hepatitis panel negative and Utox negative  Continue aggressive IV fluids and monitor  renal function and follow CPK  He is otherwise doing well, but levels remain high.       VTE Risk Mitigation (From admission, onward)        Ordered     IP VTE LOW RISK PATIENT  Once      01/02/19 6575              Ana Cristina Samayoa MD  Department of Hospital Medicine   [facility]

## 2019-01-05 NOTE — ASSESSMENT & PLAN NOTE
Patient does report recent strenuous work-outs as likely cause of rhabdomyolysis.    He has a CPK > 40K along with a dipstick with 3+ occult blood.  He an elevation in his AST/ALT of 1770/457 likely caused by rhabdomyolysis  Hepatitis panel negative and Utox negative  Continue aggressive IV fluids and monitor renal function and follow CPK  He is otherwise doing well, but levels remain high.  Will increase IVF rate to 350 ml/hr and repeat labs in am.

## 2019-01-05 NOTE — PROGRESS NOTES
[facility] Medicine  Progress Note    Patient Name: Edwin Rodriguez  MRN: [id number]  Patient Class: IP- Inpatient   Admission Date: 1/2/2019  Length of Stay: 3 days  Attending Physician: Ana Cristina Samayoa MD  Primary Care Provider: Primary Doctor No        Subjective:     Principal Problem:Non-traumatic rhabdomyolysis    HPI:  Mr. Edwin Rodrigeuz is a 23 y.o. male with no medical history who presents to [facility] ED with complaints of hematuria today.  He woke up this morning and noticed that his urine had some blood in there.  [place] went [facility] [place] and was recommended to come to the ED for evaluation.  He does say that his proximal arm and distal leg muscles have been very sore and [place]  He reports running a couple days ago and had a light jog (200 meters) yesterday.  He did some exercise this morning around 5:00 AM but otherwise did not do anything out of the ordinary for him.  He generally does not have any exercise intolerance.  He denies any fevers, chills, nausea, vomiting, abdominal pain, dysuria, hematochezia, nor melena.  He does take vitamin supplements.    Hospital Course:  Mr. Rodriguez was admitted with rhabdomyolysis induced by strenuous activity with CPK level >40K and elevated LFTS. Pt was treated with aggressive IVF and close monitoring of renal function and LFTs, and CPK levels were monitored. Acute hepatitis panel was negative and Utox was negative.    Interval History: Pt reports feeling better, legs still a little sore but improving. Urinating without difficulty and no reported SOB.    Review of Systems   Constitutional: Negative for chills and fever.   Respiratory: Negative for shortness of breath.    Cardiovascular: Negative for chest pain.   Musculoskeletal: Positive for myalgias.     Objective:     Vital Signs (Most Recent):  Temp: 98.6 °F (37 °C) (01/05/19 0725)  Pulse: 68 (01/05/19 0725)  Resp: 20 (01/05/19 0725)  BP: 122/72  (01/05/19 0725)  SpO2: 99 % (01/05/19 0725) Vital Signs (24h Range):  Temp:  [98.1 °F (36.7 °C)-98.8 °F (37.1 °C)] 98.6 °F (37 °C)  Pulse:  [54-83] 68  Resp:  [18-20] 20  SpO2:  [97 %-100 %] 99 %  BP: (119-139)/(58-83) 122/72     Weight: 62.7 kg (138 lb 4.8 oz)  Body mass index is 18.76 kg/m².    Intake/Output Summary (Last 24 hours) at 1/5/2019 1044  Last data filed at 1/5/2019 0800  Gross per 24 hour   Intake 2230 ml   Output 3950 ml   Net -1720 ml      Physical Exam   Constitutional: He is oriented to person, place, and time. He appears well-developed and well-nourished. No distress.   HENT:   Head: Normocephalic and atraumatic.   Eyes: EOM are normal. Pupils are equal, round, and reactive to light. Right eye exhibits no discharge. Left eye exhibits no discharge.   Neck: Normal range of motion.   Cardiovascular: Normal rate and regular rhythm. Exam reveals no gallop.   Pulmonary/Chest: Effort normal and breath sounds normal.   Abdominal: Soft. Bowel sounds are normal. He exhibits no distension. There is no tenderness. There is no rebound and no guarding.   Musculoskeletal: Normal range of motion. He exhibits no edema.   Neurological: He is alert and oriented to person, place, and time.   Skin: Skin is warm and dry. Capillary refill takes less than 2 seconds. He is not diaphoretic. No erythema.   Psychiatric: He has a normal mood and affect. His behavior is normal. Judgment and thought content normal.   Nursing note and vitals reviewed.      Significant Labs: All pertinent labs within the past 24 hours have been reviewed.    Significant Imaging: I have reviewed and interpreted all pertinent imaging results/findings within the past 24 hours.    Assessment/Plan:      * Non-traumatic rhabdomyolysis    Patient does report recent strenuous work-outs as likely cause of rhabdomyolysis.    He has a CPK > 40K along with a dipstick with 3+ occult blood.  He an elevation in his AST/ALT of 1770/457 likely caused by  rhabdomyolysis  Hepatitis panel negative and Utox negative  Continue aggressive IV fluids and monitor renal function and follow CPK  He is otherwise doing well, but levels remain high.  Will increase IVF rate to 350 ml/hr and repeat labs in am.       VTE Risk Mitigation (From admission, onward)        Ordered     IP VTE LOW RISK PATIENT  Once      01/02/19 1846              Ana Cristina Samayoa MD  Department of Hospital Medicine   [facility]

## 2019-01-05 NOTE — ASSESSMENT & PLAN NOTE
Patient does report recent strenuous work-outs as likely cause of rhabdomyolysis.    He has a CPK > 40K along with a dipstick with 3+ occult blood.  He an elevation in his AST/ALT of 1770/457 likely caused by rhabdomyolysis  Hepatitis panel negative and Utox negative  Continue aggressive IV fluids and monitor renal function and follow CPK  He is otherwise doing well, but levels remain high.

## 2019-01-05 NOTE — HOSPITAL COURSE
Mr. Rodriguez was admitted with rhabdomyolysis induced by strenuous activity with CPK level >40K and elevated LFTS. Pt was treated with aggressive IVF and close monitoring of renal function and LFTs, and CPK levels were monitored. Acute Hep panel was negative and Utox was negative. Patient's symptoms resolved and liver enzymes (AST 1,770 -> 89;  -> 279) + CPK (>40,000 -> 6,310) improved with IVFs. Remained independent, ambulatory and with good appetite. Patient is to f/u at [facility]'s clinic. Advised oral hydration, no heavy lifting or strenuous exercise for at least one week (or as indicated by his PCP). Regular diet. Activity as tolerated.

## 2019-01-06 LAB
ALBUMIN SERPL BCP-MCNC: 3.1 G/DL
ALP SERPL-CCNC: 52 U/L
ALT SERPL W/O P-5'-P-CCNC: 664 U/L
ANION GAP SERPL CALC-SCNC: 7 MMOL/L
AST SERPL-CCNC: 1164 U/L
BILIRUB SERPL-MCNC: 0.3 MG/DL
BUN SERPL-MCNC: 9 MG/DL
CALCIUM SERPL-MCNC: 8.8 MG/DL
CHLORIDE SERPL-SCNC: 109 MMOL/L
CK SERPL-CCNC: ABNORMAL U/L
CO2 SERPL-SCNC: 25 MMOL/L
CREAT SERPL-MCNC: 0.8 MG/DL
EST. GFR  (AFRICAN AMERICAN): >60 ML/MIN/1.73 M^2
EST. GFR  (NON AFRICAN AMERICAN): >60 ML/MIN/1.73 M^2
GLUCOSE SERPL-MCNC: 81 MG/DL
POTASSIUM SERPL-SCNC: 3.9 MMOL/L
PROT SERPL-MCNC: 5.8 G/DL
SODIUM SERPL-SCNC: 141 MMOL/L

## 2019-01-06 PROCEDURE — 25000003 PHARM REV CODE 250: Performed by: HOSPITALIST

## 2019-01-06 PROCEDURE — 80053 COMPREHEN METABOLIC PANEL: CPT

## 2019-01-06 PROCEDURE — 82550 ASSAY OF CK (CPK): CPT

## 2019-01-06 PROCEDURE — 36415 COLL VENOUS BLD VENIPUNCTURE: CPT

## 2019-01-06 PROCEDURE — 11000001 HC ACUTE MED/SURG PRIVATE ROOM

## 2019-01-06 RX ADMIN — SODIUM CHLORIDE: 0.9 INJECTION, SOLUTION INTRAVENOUS at 10:01

## 2019-01-06 RX ADMIN — SODIUM CHLORIDE: 0.9 INJECTION, SOLUTION INTRAVENOUS at 06:01

## 2019-01-06 RX ADMIN — SODIUM CHLORIDE: 0.9 INJECTION, SOLUTION INTRAVENOUS at 02:01

## 2019-01-06 NOTE — PLAN OF CARE
Problem: Pain Acute  Goal: Optimal Pain Control  Outcome: Ongoing (interventions implemented as appropriate)  Intervention: Develop Pain Management Plan   01/05/19 0800   Prevent or Manage Pain   Pain Management Interventions pain management plan reviewed with patient/caregiver;medication offered but refused;quiet environment facilitated;relaxation techniques promoted;position adjusted     Intervention: Prevent or Manage Pain   01/05/19 1821   Prevent or Manage Pain   Sensory Stimulation Regulation care clustered;lighting decreased;music/television provided for relaxation;quiet environment promoted   Sleep/Rest Enhancement noise level reduced;relaxation techniques promoted;regular sleep/rest pattern promoted;consistent schedule promoted     Intervention: Optimize Psychosocial Wellbeing   01/05/19 1821   Prevent and Minimize Fear   Diversional Activities television   Promote Anxiety Reduction   Supportive Measures active listening utilized;counseling provided;decision-making supported;positive reinforcement provided;relaxation techniques promoted;self-care encouraged;self-reflection promoted;self-responsibility promoted;verbalization of feelings encouraged

## 2019-01-07 LAB
ALBUMIN SERPL BCP-MCNC: 3 G/DL
ALP SERPL-CCNC: 59 U/L
ALT SERPL W/O P-5'-P-CCNC: 575 U/L
ANION GAP SERPL CALC-SCNC: 5 MMOL/L
AST SERPL-CCNC: 674 U/L
BILIRUB SERPL-MCNC: 0.4 MG/DL
BUN SERPL-MCNC: 12 MG/DL
CALCIUM SERPL-MCNC: 8.9 MG/DL
CHLORIDE SERPL-SCNC: 108 MMOL/L
CK SERPL-CCNC: ABNORMAL U/L
CO2 SERPL-SCNC: 28 MMOL/L
CREAT SERPL-MCNC: 0.9 MG/DL
EST. GFR  (AFRICAN AMERICAN): >60 ML/MIN/1.73 M^2
EST. GFR  (NON AFRICAN AMERICAN): >60 ML/MIN/1.73 M^2
GLUCOSE SERPL-MCNC: 74 MG/DL
POTASSIUM SERPL-SCNC: 4.3 MMOL/L
PROT SERPL-MCNC: 5.9 G/DL
SODIUM SERPL-SCNC: 141 MMOL/L

## 2019-01-07 PROCEDURE — 11000001 HC ACUTE MED/SURG PRIVATE ROOM

## 2019-01-07 PROCEDURE — 82550 ASSAY OF CK (CPK): CPT

## 2019-01-07 PROCEDURE — 36415 COLL VENOUS BLD VENIPUNCTURE: CPT

## 2019-01-07 PROCEDURE — 25000003 PHARM REV CODE 250: Performed by: HOSPITALIST

## 2019-01-07 PROCEDURE — 80053 COMPREHEN METABOLIC PANEL: CPT

## 2019-01-07 RX ADMIN — SODIUM CHLORIDE: 0.9 INJECTION, SOLUTION INTRAVENOUS at 10:01

## 2019-01-07 RX ADMIN — SODIUM CHLORIDE: 0.9 INJECTION, SOLUTION INTRAVENOUS at 01:01

## 2019-01-07 RX ADMIN — SODIUM CHLORIDE: 0.9 INJECTION, SOLUTION INTRAVENOUS at 12:01

## 2019-01-07 RX ADMIN — SODIUM CHLORIDE: 0.9 INJECTION, SOLUTION INTRAVENOUS at 08:01

## 2019-01-07 RX ADMIN — SODIUM CHLORIDE: 0.9 INJECTION, SOLUTION INTRAVENOUS at 02:01

## 2019-01-07 RX ADMIN — SODIUM CHLORIDE: 0.9 INJECTION, SOLUTION INTRAVENOUS at 06:01

## 2019-01-07 RX ADMIN — SODIUM CHLORIDE: 0.9 INJECTION, SOLUTION INTRAVENOUS at 04:01

## 2019-01-07 NOTE — SUBJECTIVE & OBJECTIVE
Interval History: Pt reports feeling better, legs no longer sore. Urinating without difficulty and no reported SOB.    Review of Systems   Constitutional: Negative for chills and fever.   Respiratory: Negative for shortness of breath.    Cardiovascular: Negative for chest pain.   Musculoskeletal: Positive for myalgias.     Objective:     Vital Signs (Most Recent):  Temp: 99.2 °F (37.3 °C) (01/06/19 1945)  Pulse: 62 (01/06/19 1945)  Resp: 18 (01/06/19 1945)  BP: (!) 146/75 (01/06/19 1945)  SpO2: 98 % (01/06/19 1945) Vital Signs (24h Range):  Temp:  [98 °F (36.7 °C)-99.2 °F (37.3 °C)] 99.2 °F (37.3 °C)  Pulse:  [57-72] 62  Resp:  [17-20] 18  SpO2:  [97 %-100 %] 98 %  BP: (123-146)/(58-76) 146/75     Weight: 62.9 kg (138 lb 9 oz)  Body mass index is 18.79 kg/m².    Intake/Output Summary (Last 24 hours) at 1/6/2019 2207  Last data filed at 1/6/2019 1800  Gross per 24 hour   Intake 4414.17 ml   Output 3850 ml   Net 564.17 ml      Physical Exam   Constitutional: He is oriented to person, place, and time. He appears well-developed and well-nourished. No distress.   HENT:   Head: Normocephalic and atraumatic.   Eyes: EOM are normal. Pupils are equal, round, and reactive to light. Right eye exhibits no discharge. Left eye exhibits no discharge.   Neck: Normal range of motion.   Cardiovascular: Normal rate and regular rhythm. Exam reveals no gallop.   Pulmonary/Chest: Effort normal and breath sounds normal.   Abdominal: Soft. Bowel sounds are normal. He exhibits no distension. There is no tenderness. There is no rebound and no guarding.   Musculoskeletal: Normal range of motion. He exhibits no edema.   Neurological: He is alert and oriented to person, place, and time.   Skin: Skin is warm and dry. Capillary refill takes less than 2 seconds. He is not diaphoretic. No erythema.   Psychiatric: He has a normal mood and affect. His behavior is normal. Judgment and thought content normal.   Nursing note and vitals  reviewed.      Significant Labs: All pertinent labs within the past 24 hours have been reviewed.    Significant Imaging: I have reviewed and interpreted all pertinent imaging results/findings within the past 24 hours.

## 2019-01-07 NOTE — PLAN OF CARE
01/07/19 1233   Discharge Reassessment   Assessment Type Discharge Planning Assessment   Discharge Plan A Home   Discharge Plan B Home   Anticipated Discharge Disposition Home   Can the patient answer the patient profile reliably? Yes, cognitively intact   How does the patient rate their overall health at the present time? Fair   Describe the patient's ability to walk at the present time. No restrictions   How often would a person be available to care for the patient? Whenever needed   Number of comorbid conditions (as recorded on the chart) One

## 2019-01-07 NOTE — PLAN OF CARE
Problem: Adult Inpatient Plan of Care  Goal: Plan of Care Review   01/07/19 0526   Plan of Care Review   Plan of Care Reviewed With patient   Patient continues on aggressive IV Fluids. No adverse reactions noted. Urine output clear yellow. No foul odor noted. Patient denies pain and discomfort at present time. Safety maintained, call light in reach will monitor.

## 2019-01-07 NOTE — PROGRESS NOTES
[facility] Hospital Medicine  Progress Note    Patient Name: Edwin Rodriguez  MRN: [id number]  Patient Class: IP- Inpatient   Admission Date: 1/2/2019  Length of Stay: 4 days  Attending Physician: Ana Cristina Samayoa MD  Primary Care Provider: Primary Doctor No        Subjective:     Principal Problem:Non-traumatic rhabdomyolysis    HPI:  Mr. Edwin Rodriguez is a 23 y.o. male with no medical history who presents to [facility] ED with complaints of hematuria today.  He woke up this morning and noticed that his urine had some blood in there.  He went [place] the [facility] [place] and was recommended to come to the ED for evaluation.  He does say that his proximal arm and distal leg muscles have been very sore and [place]  He reports running a couple days ago and had a light jog (200 meters) yesterday.  He did some exercise this morning around 5:00 AM but otherwise did not do anything out of the ordinary for him.  He generally does not have any exercise intolerance.  He denies any fevers, chills, nausea, vomiting, abdominal pain, dysuria, hematochezia, nor melena.  He does take vitamin supplements.    Hospital Course:  Mr. Rodriguez was admitted with rhabdomyolysis induced by strenuous activity with CPK level >40K and elevated LFTS. Pt was treated with aggressive IVF and close monitoring of renal function and LFTs, and CPK levels were monitored. Acute hepatitis panel was negative and Utox was negative.    Interval History: Pt reports feeling better, legs no longer sore. Urinating without difficulty and no reported SOB.    Review of Systems   Constitutional: Negative for chills and fever.   Respiratory: Negative for shortness of breath.    Cardiovascular: Negative for chest pain.   Musculoskeletal: Positive for myalgias.     Objective:     Vital Signs (Most Recent):  Temp: 99.2 °F (37.3 °C) (01/06/19 1945)  Pulse: 62 (01/06/19 1945)  Resp: 18 (01/06/19 1945)  BP: (!) 146/75 (01/06/19  1945)  SpO2: 98 % (01/06/19 1945) Vital Signs (24h Range):  Temp:  [98 °F (36.7 °C)-99.2 °F (37.3 °C)] 99.2 °F (37.3 °C)  Pulse:  [57-72] 62  Resp:  [17-20] 18  SpO2:  [97 %-100 %] 98 %  BP: (123-146)/(58-76) 146/75     Weight: 62.9 kg (138 lb 9 oz)  Body mass index is 18.79 kg/m².    Intake/Output Summary (Last 24 hours) at 1/6/2019 2207  Last data filed at 1/6/2019 1800  Gross per 24 hour   Intake 4414.17 ml   Output 3850 ml   Net 564.17 ml      Physical Exam   Constitutional: He is oriented to person, place, and time. He appears well-developed and well-nourished. No distress.   HENT:   Head: Normocephalic and atraumatic.   Eyes: EOM are normal. Pupils are equal, round, and reactive to light. Right eye exhibits no discharge. Left eye exhibits no discharge.   Neck: Normal range of motion.   Cardiovascular: Normal rate and regular rhythm. Exam reveals no gallop.   Pulmonary/Chest: Effort normal and breath sounds normal.   Abdominal: Soft. Bowel sounds are normal. He exhibits no distension. There is no tenderness. There is no rebound and no guarding.   Musculoskeletal: Normal range of motion. He exhibits no edema.   Neurological: He is alert and oriented to person, place, and time.   Skin: Skin is warm and dry. Capillary refill takes less than 2 seconds. He is not diaphoretic. No erythema.   Psychiatric: He has a normal mood and affect. His behavior is normal. Judgment and thought content normal.   Nursing note and vitals reviewed.      Significant Labs: All pertinent labs within the past 24 hours have been reviewed.    Significant Imaging: I have reviewed and interpreted all pertinent imaging results/findings within the past 24 hours.    Assessment/Plan:      * Non-traumatic rhabdomyolysis    Patient does report recent strenuous work-outs as likely cause of rhabdomyolysis.    He has a CPK > 40K along with a dipstick with 3+ occult blood.  He an elevation in his AST/ALT of 1770/457 likely caused by  rhabdomyolysis  Hepatitis panel negative and Utox negative  Continue aggressive IV fluids and monitor renal function and follow CPK  He is otherwise doing well, but levels remain high.  Will increase IVF rate to 500 ml/hr from 350 ml/hr today and repeat labs in am.       VTE Risk Mitigation (From admission, onward)        Ordered     IP VTE LOW RISK PATIENT  Once      01/02/19 1015              Ana Cristina Samayoa MD  Department of Hospital Medicine   [facility]

## 2019-01-07 NOTE — ASSESSMENT & PLAN NOTE
Patient does report recent strenuous work-outs as likely cause of rhabdomyolysis.    He has a CPK > 40K along with a dipstick with 3+ occult blood.  He an elevation in his AST/ALT of 1770/457 likely caused by rhabdomyolysis  Hepatitis panel negative and Utox negative  Continue aggressive IV fluids and monitor renal function and follow CPK  He is otherwise doing well, but levels remain high.  Will increase IVF rate to 500 ml/hr from 350 ml/hr today and repeat labs in am.

## 2019-01-08 PROBLEM — R74.01 TRANSAMINITIS: Status: ACTIVE | Noted: 2019-01-08

## 2019-01-08 LAB
ALBUMIN SERPL BCP-MCNC: 3.3 G/DL
ALP SERPL-CCNC: 60 U/L
ALT SERPL W/O P-5'-P-CCNC: 526 U/L
ANION GAP SERPL CALC-SCNC: 6 MMOL/L
AST SERPL-CCNC: 464 U/L
BILIRUB SERPL-MCNC: 0.3 MG/DL
BUN SERPL-MCNC: 9 MG/DL
CALCIUM SERPL-MCNC: 9.4 MG/DL
CHLORIDE SERPL-SCNC: 107 MMOL/L
CK SERPL-CCNC: ABNORMAL U/L
CO2 SERPL-SCNC: 27 MMOL/L
CREAT SERPL-MCNC: 0.9 MG/DL
EST. GFR  (AFRICAN AMERICAN): >60 ML/MIN/1.73 M^2
EST. GFR  (NON AFRICAN AMERICAN): >60 ML/MIN/1.73 M^2
GLUCOSE SERPL-MCNC: 75 MG/DL
POTASSIUM SERPL-SCNC: 3.6 MMOL/L
PROT SERPL-MCNC: 6.3 G/DL
SODIUM SERPL-SCNC: 140 MMOL/L

## 2019-01-08 PROCEDURE — 25000003 PHARM REV CODE 250: Performed by: HOSPITALIST

## 2019-01-08 PROCEDURE — 36415 COLL VENOUS BLD VENIPUNCTURE: CPT

## 2019-01-08 PROCEDURE — 82550 ASSAY OF CK (CPK): CPT

## 2019-01-08 PROCEDURE — 11000001 HC ACUTE MED/SURG PRIVATE ROOM

## 2019-01-08 PROCEDURE — 80053 COMPREHEN METABOLIC PANEL: CPT

## 2019-01-08 RX ADMIN — SODIUM CHLORIDE: 0.9 INJECTION, SOLUTION INTRAVENOUS at 03:01

## 2019-01-08 RX ADMIN — SODIUM CHLORIDE: 0.9 INJECTION, SOLUTION INTRAVENOUS at 01:01

## 2019-01-08 RX ADMIN — SODIUM CHLORIDE: 0.9 INJECTION, SOLUTION INTRAVENOUS at 05:01

## 2019-01-08 RX ADMIN — SODIUM CHLORIDE: 0.9 INJECTION, SOLUTION INTRAVENOUS at 11:01

## 2019-01-08 RX ADMIN — SODIUM CHLORIDE: 0.9 INJECTION, SOLUTION INTRAVENOUS at 08:01

## 2019-01-08 NOTE — PROGRESS NOTES
[facility] Hospital Medicine  Progress Note    Patient Name: Edwin Rodriguez  MRN: [id number]  Patient Class: IP- Inpatient   Admission Date: 1/2/2019  Length of Stay: 6 days  Attending Physician: Sobeida Umanzor MD  Primary Care Provider: Primary Doctor No        Subjective:     Principal Problem:Non-traumatic rhabdomyolysis    HPI:  Mr. Edwin Rodriguez is a 23 y.o. male with no medical history who presents to [facility] ED with complaints of hematuria today.  He woke up this morning and noticed that his urine had some blood in there.  [place] [facility] [place] and [place] recommended to come to the ED for evaluation.  He does say that his proximal arm and distal leg muscles have been very sore and [place]  He reports running a couple days ago and had a light jog (200 meters) yesterday.  He did some exercise this morning around 5:00 AM but otherwise did not do anything out of the ordinary for him.  He generally does not have any exercise intolerance.  He denies any fevers, chills, nausea, vomiting, abdominal pain, dysuria, hematochezia, nor melena.  He does take vitamin supplements.    Hospital Course:  Mr. Rodriguez was admitted with rhabdomyolysis induced by strenuous activity with CPK level >40K and elevated LFTS. Pt was treated with aggressive IVF and close monitoring of renal function and LFTs, and CPK levels were monitored. Acute hepatitis panel was negative and Utox was negative.    Interval History: clinically stable. CPK starting to trend downwards    Review of Systems   Constitutional: Negative for chills and fever.   Respiratory: Negative for shortness of breath.    Cardiovascular: Negative for chest pain.   Musculoskeletal: Negative for myalgias.     Objective:     Vital Signs (Most Recent):  Temp: 98.3 °F (36.8 °C) (01/08/19 0803)  Pulse: (!) 54 (01/08/19 0803)  Resp: 17 (01/08/19 0803)  BP: 136/67 (01/08/19 0803)  SpO2: 97 % (01/08/19 0803) Vital Signs  (24h Range):  Temp:  [98 °F (36.7 °C)-98.5 °F (36.9 °C)] 98.3 °F (36.8 °C)  Pulse:  [54-73] 54  Resp:  [17-19] 17  SpO2:  [97 %-100 %] 97 %  BP: (125-149)/(66-84) 136/67     Weight: 64 kg (141 lb 2 oz)  Body mass index is 19.14 kg/m².    Intake/Output Summary (Last 24 hours) at 1/8/2019 1305  Last data filed at 1/8/2019 0300  Gross per 24 hour   Intake 6590 ml   Output 5400 ml   Net 1190 ml      Physical Exam   Constitutional: He is oriented to person, place, and time. He appears well-developed and well-nourished. No distress.   HENT:   Head: Normocephalic and atraumatic.   Eyes: EOM are normal. Pupils are equal, round, and reactive to light. Right eye exhibits no discharge. Left eye exhibits no discharge.   Neck: Normal range of motion.   Cardiovascular: Normal rate and regular rhythm. Exam reveals no gallop.   Pulmonary/Chest: Effort normal and breath sounds normal.   Abdominal: Soft. Bowel sounds are normal. He exhibits no distension. There is no tenderness. There is no rebound and no guarding.   Musculoskeletal: Normal range of motion. He exhibits no edema.   Neurological: He is alert and oriented to person, place, and time.   Skin: Skin is warm and dry. Capillary refill takes less than 2 seconds. He is not diaphoretic. No erythema.   Psychiatric: He has a normal mood and affect. His behavior is normal. Judgment and thought content normal.   Nursing note and vitals reviewed.      Significant Labs: All pertinent labs within the past 24 hours have been reviewed.    Significant Imaging: I have reviewed and interpreted all pertinent imaging results/findings within the past 24 hours.    Assessment/Plan:      * Non-traumatic rhabdomyolysis    Patient reported recent strenuous work-outs as likely cause of rhabdomyolysis.    He has a CPK > 40K along with a dipstick with 3+ occult blood.  He had elevation in his AST/ALT of 1770/457 likely caused by rhabdomyolysis as well  Hepatitis panel negative and Utox  negative  Continue aggressive IV fluids and monitor renal function and follow CPK  He is otherwise doing well, but CPK levels still elevated now in the 30Ks  LFTs trending down   Pt tolerating IVF rate increase to 500 ml/hr (from 250, then 350 ml/hr) since 1/4/19.  Repeat labs in am and plan on discharge when CPK <10K     Transaminitis    As above         VTE Risk Mitigation (From admission, onward)        Ordered     IP VTE LOW RISK PATIENT  Once      01/02/19 7245              Sobeida Stewart MD  Department of Hospital Medicine   [facility]

## 2019-01-08 NOTE — ASSESSMENT & PLAN NOTE
Patient reported recent strenuous work-outs as likely cause of rhabdomyolysis.    He has a CPK > 40K along with a dipstick with 3+ occult blood.  He had elevation in his AST/ALT of 1770/457 likely caused by rhabdomyolysis as well  Hepatitis panel negative and Utox negative  Continue aggressive IV fluids and monitor renal function and follow CPK  He is otherwise doing well, but CPK levels remain high at >40K.  LFTs trending down nicely  Pt tolerating IVF rate increase to 500 ml/hr (from 250, then 350 ml/hr) since 1/4/19.  Repeat labs in am and plan on discharge when CPK <10K

## 2019-01-08 NOTE — SUBJECTIVE & OBJECTIVE
Interval History: clinically stable. CPK starting to trend downwards    Review of Systems   Constitutional: Negative for chills and fever.   Respiratory: Negative for shortness of breath.    Cardiovascular: Negative for chest pain.   Musculoskeletal: Negative for myalgias.     Objective:     Vital Signs (Most Recent):  Temp: 98.3 °F (36.8 °C) (01/08/19 0803)  Pulse: (!) 54 (01/08/19 0803)  Resp: 17 (01/08/19 0803)  BP: 136/67 (01/08/19 0803)  SpO2: 97 % (01/08/19 0803) Vital Signs (24h Range):  Temp:  [98 °F (36.7 °C)-98.5 °F (36.9 °C)] 98.3 °F (36.8 °C)  Pulse:  [54-73] 54  Resp:  [17-19] 17  SpO2:  [97 %-100 %] 97 %  BP: (125-149)/(66-84) 136/67     Weight: 64 kg (141 lb 2 oz)  Body mass index is 19.14 kg/m².    Intake/Output Summary (Last 24 hours) at 1/8/2019 1305  Last data filed at 1/8/2019 0300  Gross per 24 hour   Intake 6590 ml   Output 5400 ml   Net 1190 ml      Physical Exam   Constitutional: He is oriented to person, place, and time. He appears well-developed and well-nourished. No distress.   HENT:   Head: Normocephalic and atraumatic.   Eyes: EOM are normal. Pupils are equal, round, and reactive to light. Right eye exhibits no discharge. Left eye exhibits no discharge.   Neck: Normal range of motion.   Cardiovascular: Normal rate and regular rhythm. Exam reveals no gallop.   Pulmonary/Chest: Effort normal and breath sounds normal.   Abdominal: Soft. Bowel sounds are normal. He exhibits no distension. There is no tenderness. There is no rebound and no guarding.   Musculoskeletal: Normal range of motion. He exhibits no edema.   Neurological: He is alert and oriented to person, place, and time.   Skin: Skin is warm and dry. Capillary refill takes less than 2 seconds. He is not diaphoretic. No erythema.   Psychiatric: He has a normal mood and affect. His behavior is normal. Judgment and thought content normal.   Nursing note and vitals reviewed.      Significant Labs: All pertinent labs within the past 24  hours have been reviewed.    Significant Imaging: I have reviewed and interpreted all pertinent imaging results/findings within the past 24 hours.

## 2019-01-08 NOTE — PROGRESS NOTES
[facility] Hospital Medicine  Progress Note    Patient Name: Edwin Rodriguez  MRN: [id number]  Patient Class: IP- Inpatient   Admission Date: 1/2/2019  Length of Stay: 5 days  Attending Physician: Ana Cristina Samayoa MD  Primary Care Provider: Primary Doctor No        Subjective:     Principal Problem:Non-traumatic rhabdomyolysis    HPI:  Mr. Edwin Rodriguez is a 23 y.o. male with no medical history who presents to [facility] ED with complaints of hematuria today.  He woke up this morning and noticed that his urine had some blood in there.  [place] went [facility] the [facility] [place] and was recommended to come to the ED for evaluation.  He does say that his proximal arm and distal leg muscles have been very sore and [place]  He reports running a couple days ago and had a light jog (200 meters) yesterday.  He did some exercise this morning around 5:00 AM but otherwise did not do anything out of the ordinary for him.  He generally does not have any exercise intolerance.  He denies any fevers, chills, nausea, vomiting, abdominal pain, dysuria, hematochezia, nor melena.  He does take vitamin supplements.    Hospital Course:  Mr. Rodriguez was admitted with rhabdomyolysis induced by strenuous activity with CPK level >40K and elevated LFTS. Pt was treated with aggressive IVF and close monitoring of renal function and LFTs, and CPK levels were monitored. Acute hepatitis panel was negative and Utox was negative.    Interval History: Pt reports legs no longer sore. Urinating without difficulty and no reported SOB.    Review of Systems   Constitutional: Negative for chills and fever.   Respiratory: Negative for shortness of breath.    Cardiovascular: Negative for chest pain.   Musculoskeletal: Negative for myalgias.     Objective:     Vital Signs (Most Recent):  Temp: 98.2 °F (36.8 °C) (01/07/19 1928)  Pulse: 73 (01/07/19 1928)  Resp: 18 (01/07/19 1928)  BP: 137/84 (01/07/19 1928)  SpO2: 100 %  (01/07/19 1928) Vital Signs (24h Range):  Temp:  [98 °F (36.7 °C)-98.5 °F (36.9 °C)] 98.2 °F (36.8 °C)  Pulse:  [58-80] 73  Resp:  [18-19] 18  SpO2:  [98 %-100 %] 100 %  BP: (124-149)/(62-92) 137/84     Weight: 65 kg (143 lb 6 oz)  Body mass index is 19.45 kg/m².    Intake/Output Summary (Last 24 hours) at 1/7/2019 1947  Last data filed at 1/7/2019 1842  Gross per 24 hour   Intake 12108 ml   Output 85358 ml   Net 2035 ml      Physical Exam   Constitutional: He is oriented to person, place, and time. He appears well-developed and well-nourished. No distress.   HENT:   Head: Normocephalic and atraumatic.   Eyes: EOM are normal. Pupils are equal, round, and reactive to light. Right eye exhibits no discharge. Left eye exhibits no discharge.   Neck: Normal range of motion.   Cardiovascular: Normal rate and regular rhythm. Exam reveals no gallop.   Pulmonary/Chest: Effort normal and breath sounds normal.   Abdominal: Soft. Bowel sounds are normal. He exhibits no distension. There is no tenderness. There is no rebound and no guarding.   Musculoskeletal: Normal range of motion. He exhibits no edema.   Neurological: He is alert and oriented to person, place, and time.   Skin: Skin is warm and dry. Capillary refill takes less than 2 seconds. He is not diaphoretic. No erythema.   Psychiatric: He has a normal mood and affect. His behavior is normal. Judgment and thought content normal.   Nursing note and vitals reviewed.      Significant Labs: All pertinent labs within the past 24 hours have been reviewed.    Significant Imaging: I have reviewed and interpreted all pertinent imaging results/findings within the past 24 hours.    Assessment/Plan:      * Non-traumatic rhabdomyolysis    Patient reported recent strenuous work-outs as likely cause of rhabdomyolysis.    He has a CPK > 40K along with a dipstick with 3+ occult blood.  He had elevation in his AST/ALT of 1770/457 likely caused by rhabdomyolysis as well  Hepatitis panel  negative and Utox negative  Continue aggressive IV fluids and monitor renal function and follow CPK  He is otherwise doing well, but CPK levels remain high at >40K.  LFTs trending down nicely  Pt tolerating IVF rate increase to 500 ml/hr (from 250, then 350 ml/hr) since 1/4/19.  Repeat labs in am and plan on discharge when CPK <10K       VTE Risk Mitigation (From admission, onward)        Ordered     IP VTE LOW RISK PATIENT  Once      01/02/19 9879              Ana Cristina Samayoa MD  Department of Hospital Medicine   [facility]

## 2019-01-08 NOTE — SUBJECTIVE & OBJECTIVE
Interval History: Pt reports legs no longer sore. Urinating without difficulty and no reported SOB.    Review of Systems   Constitutional: Negative for chills and fever.   Respiratory: Negative for shortness of breath.    Cardiovascular: Negative for chest pain.   Musculoskeletal: Negative for myalgias.     Objective:     Vital Signs (Most Recent):  Temp: 98.2 °F (36.8 °C) (01/07/19 1928)  Pulse: 73 (01/07/19 1928)  Resp: 18 (01/07/19 1928)  BP: 137/84 (01/07/19 1928)  SpO2: 100 % (01/07/19 1928) Vital Signs (24h Range):  Temp:  [98 °F (36.7 °C)-98.5 °F (36.9 °C)] 98.2 °F (36.8 °C)  Pulse:  [58-80] 73  Resp:  [18-19] 18  SpO2:  [98 %-100 %] 100 %  BP: (124-149)/(62-92) 137/84     Weight: 65 kg (143 lb 6 oz)  Body mass index is 19.45 kg/m².    Intake/Output Summary (Last 24 hours) at 1/7/2019 1947  Last data filed at 1/7/2019 1842  Gross per 24 hour   Intake 50462 ml   Output 62664 ml   Net 2035 ml      Physical Exam   Constitutional: He is oriented to person, place, and time. He appears well-developed and well-nourished. No distress.   HENT:   Head: Normocephalic and atraumatic.   Eyes: EOM are normal. Pupils are equal, round, and reactive to light. Right eye exhibits no discharge. Left eye exhibits no discharge.   Neck: Normal range of motion.   Cardiovascular: Normal rate and regular rhythm. Exam reveals no gallop.   Pulmonary/Chest: Effort normal and breath sounds normal.   Abdominal: Soft. Bowel sounds are normal. He exhibits no distension. There is no tenderness. There is no rebound and no guarding.   Musculoskeletal: Normal range of motion. He exhibits no edema.   Neurological: He is alert and oriented to person, place, and time.   Skin: Skin is warm and dry. Capillary refill takes less than 2 seconds. He is not diaphoretic. No erythema.   Psychiatric: He has a normal mood and affect. His behavior is normal. Judgment and thought content normal.   Nursing note and vitals reviewed.      Significant Labs: All  pertinent labs within the past 24 hours have been reviewed.    Significant Imaging: I have reviewed and interpreted all pertinent imaging results/findings within the past 24 hours.

## 2019-01-08 NOTE — ASSESSMENT & PLAN NOTE
Patient reported recent strenuous work-outs as likely cause of rhabdomyolysis.    He has a CPK > 40K along with a dipstick with 3+ occult blood.  He had elevation in his AST/ALT of 1770/457 likely caused by rhabdomyolysis as well  Hepatitis panel negative and Utox negative  Continue aggressive IV fluids and monitor renal function and follow CPK  He is otherwise doing well, but CPK levels still elevated now in the 30Ks  LFTs trending down   Pt tolerating IVF rate increase to 500 ml/hr (from 250, then 350 ml/hr) since 1/4/19.  Repeat labs in am and plan on discharge when CPK <10K

## 2019-01-08 NOTE — PLAN OF CARE
Problem: Adult Inpatient Plan of Care  Goal: Plan of Care Review  Outcome: Ongoing (interventions implemented as appropriate)  Pt. AAOx4; calm and cooperative. No falls no injuries. V/S stable, afebrile. IV currently infusing; NS at 500mL/hr. Scheduled meds given without difficulty. No complaints of pain/discomfort. No nausea/vomiting. Patient instructed to call if assistance is needed. Bed in lowest position; side rails x3. Call light in reach. Will continue to monitor.     01/08/19 0628   Plan of Care Review   Plan of Care Reviewed With patient

## 2019-01-09 LAB
ALBUMIN SERPL BCP-MCNC: 3.1 G/DL
ALP SERPL-CCNC: 56 U/L
ALT SERPL W/O P-5'-P-CCNC: 397 U/L
ANION GAP SERPL CALC-SCNC: 7 MMOL/L
AST SERPL-CCNC: 223 U/L
BILIRUB SERPL-MCNC: 0.3 MG/DL
BUN SERPL-MCNC: 9 MG/DL
CALCIUM SERPL-MCNC: 9 MG/DL
CHLORIDE SERPL-SCNC: 109 MMOL/L
CK SERPL-CCNC: ABNORMAL U/L
CO2 SERPL-SCNC: 25 MMOL/L
CREAT SERPL-MCNC: 0.8 MG/DL
EST. GFR  (AFRICAN AMERICAN): >60 ML/MIN/1.73 M^2
EST. GFR  (NON AFRICAN AMERICAN): >60 ML/MIN/1.73 M^2
GLUCOSE SERPL-MCNC: 81 MG/DL
POTASSIUM SERPL-SCNC: 3.5 MMOL/L
PROT SERPL-MCNC: 6 G/DL
SODIUM SERPL-SCNC: 141 MMOL/L

## 2019-01-09 PROCEDURE — 11000001 HC ACUTE MED/SURG PRIVATE ROOM

## 2019-01-09 PROCEDURE — 25000003 PHARM REV CODE 250: Performed by: INTERNAL MEDICINE

## 2019-01-09 PROCEDURE — 36415 COLL VENOUS BLD VENIPUNCTURE: CPT

## 2019-01-09 PROCEDURE — 82550 ASSAY OF CK (CPK): CPT

## 2019-01-09 PROCEDURE — 25000003 PHARM REV CODE 250: Performed by: HOSPITALIST

## 2019-01-09 PROCEDURE — 80053 COMPREHEN METABOLIC PANEL: CPT

## 2019-01-09 RX ORDER — SODIUM CHLORIDE 9 MG/ML
INJECTION, SOLUTION INTRAVENOUS CONTINUOUS
Status: DISCONTINUED | OUTPATIENT
Start: 2019-01-09 | End: 2019-01-11 | Stop reason: HOSPADM

## 2019-01-09 RX ADMIN — SODIUM CHLORIDE: 0.9 INJECTION, SOLUTION INTRAVENOUS at 09:01

## 2019-01-09 RX ADMIN — SODIUM CHLORIDE: 0.9 INJECTION, SOLUTION INTRAVENOUS at 11:01

## 2019-01-09 RX ADMIN — SODIUM CHLORIDE: 0.9 INJECTION, SOLUTION INTRAVENOUS at 03:01

## 2019-01-09 RX ADMIN — SODIUM CHLORIDE: 0.9 INJECTION, SOLUTION INTRAVENOUS at 05:01

## 2019-01-09 RX ADMIN — SODIUM CHLORIDE: 0.9 INJECTION, SOLUTION INTRAVENOUS at 01:01

## 2019-01-09 NOTE — PLAN OF CARE
Problem: Adult Inpatient Plan of Care  Goal: Plan of Care Review  Outcome: Ongoing (interventions implemented as appropriate)   01/08/19 6470   Plan of Care Review   Plan of Care Reviewed With patient   Patient is receiving normal saline boluses continuously out put is equal to input, no complaints of pain

## 2019-01-09 NOTE — ASSESSMENT & PLAN NOTE
Patient reported recent strenuous work-outs as likely cause of rhabdomyolysis.    CPK > 40K along with a dipstick with 3+ occult blood. Renal function stable  He had elevation in his AST/ALT of 1770/457 likely caused by rhabdomyolysis as well  Hepatitis panel negative and Utox negative  Continue aggressive IV fluids and monitor renal function and follow CPK  Still very elevated however AST/ALT are improving which is reassuring   He is otherwise doing well. Is independent and ambulatory  Pt tolerating IVF rate increase to 500 ml/hr (from 250, then 350 ml/hr) since 1/4/19.  Repeat labs in am and plan on discharge when CPK <10K

## 2019-01-09 NOTE — PLAN OF CARE
Problem: Adult Inpatient Plan of Care  Goal: Plan of Care Review  Outcome: Ongoing (interventions implemented as appropriate)  Pt. AAOx4; calm and cooperative. No falls no injuries. V/S stable, afebrile. IV currently infusing; NS at 500mL/hr. Scheduled meds given without difficulty. No complaints of pain/discomfort. No nausea/vomiting. Patient instructed to call if assistance is needed. Bed in lowest position; side rails x3. Call light in reach. Will continue to monitor.     01/09/19 0029   Plan of Care Review   Plan of Care Reviewed With patient

## 2019-01-09 NOTE — PROGRESS NOTES
[facility] Hospital Medicine  Progress Note    Patient Name: Edwin Rodriguez  MRN: [id number]  Patient Class: IP- Inpatient   Admission Date: 1/2/2019  Length of Stay: 7 days  Attending Physician: Sobeida Umanzor MD  Primary Care Provider: Primary Doctor No        Subjective:     Principal Problem:Non-traumatic rhabdomyolysis    HPI:  Mr. Edwin Rodriguez is a 23 y.o. male with no medical history who presents to [facility] ED with complaints of hematuria today.  He woke up this morning and noticed that his urine had some blood in there.  He went [facility] the [facility] [place] and was recommended to come to the ED for evaluation.  He does say that his proximal arm and distal leg muscles have been very sore and [place]  He reports running a couple days ago and had a light jog (200 meters) yesterday.  He did some exercise this morning around 5:00 AM but otherwise did not do anything out of the ordinary for him.  He generally does not have any exercise intolerance.  He denies any fevers, chills, nausea, vomiting, abdominal pain, dysuria, hematochezia, nor melena.  He does take vitamin supplements.    Hospital Course:  Mr. Rodriguez was admitted with rhabdomyolysis induced by strenuous activity with CPK level >40K and elevated LFTS. Pt was treated with aggressive IVF and close monitoring of renal function and LFTs, and CPK levels were monitored. Acute hepatitis panel was negative and Utox was negative.    Interval History: clinically stable. CPK again > 40K but AST is improving and his symptoms are much better.     Review of Systems   Constitutional: Negative for chills and fever.   Respiratory: Negative for shortness of breath.    Cardiovascular: Negative for chest pain.   Musculoskeletal: Positive for myalgias (much better).     Objective:     Vital Signs (Most Recent):  Temp: 98.5 °F (36.9 °C) (01/09/19 0742)  Pulse: (!) 56 (01/09/19 0742)  Resp: 19 (01/09/19 0742)  BP: (!) 140/70  (01/09/19 0742)  SpO2: 99 % (01/09/19 0742) Vital Signs (24h Range):  Temp:  [98.1 °F (36.7 °C)-98.5 °F (36.9 °C)] 98.5 °F (36.9 °C)  Pulse:  [56-68] 56  Resp:  [17-19] 19  SpO2:  [97 %-99 %] 99 %  BP: (124-140)/(58-74) 140/70     Weight: 64 kg (141 lb 2 oz)  Body mass index is 19.14 kg/m².    Intake/Output Summary (Last 24 hours) at 1/9/2019 0821  Last data filed at 1/9/2019 0500  Gross per 24 hour   Intake 360 ml   Output 6250 ml   Net -5890 ml      Physical Exam   Constitutional: He is oriented to person, place, and time. He appears well-developed and well-nourished. No distress.   HENT:   Head: Normocephalic and atraumatic.   Eyes: EOM are normal. Pupils are equal, round, and reactive to light. Right eye exhibits no discharge. Left eye exhibits no discharge.   Neck: Normal range of motion.   Cardiovascular: Normal rate and regular rhythm. Exam reveals no gallop.   Pulmonary/Chest: Effort normal and breath sounds normal.   Abdominal: Soft. Bowel sounds are normal. He exhibits no distension. There is no tenderness. There is no rebound and no guarding.   Musculoskeletal: Normal range of motion. He exhibits no edema.   Neurological: He is alert and oriented to person, place, and time.   Skin: Skin is warm and dry. Capillary refill takes less than 2 seconds. He is not diaphoretic. No erythema.   Psychiatric: He has a normal mood and affect. His behavior is normal. Judgment and thought content normal.   Nursing note and vitals reviewed.      Significant Labs: All pertinent labs within the past 24 hours have been reviewed.    Significant Imaging: I have reviewed and interpreted all pertinent imaging results/findings within the past 24 hours.    Assessment/Plan:      * Non-traumatic rhabdomyolysis    Patient reported recent strenuous work-outs as likely cause of rhabdomyolysis.    CPK > 40K along with a dipstick with 3+ occult blood. Renal function stable  He had elevation in his AST/ALT of 1770/457 likely caused by  rhabdomyolysis as well  Hepatitis panel negative and Utox negative  Continue aggressive IV fluids and monitor renal function and follow CPK  Still very elevated however AST/ALT are improving which is reassuring   He is otherwise doing well. Is independent and ambulatory  Pt tolerating IVF rate increase to 500 ml/hr (from 250, then 350 ml/hr) since 1/4/19.  Repeat labs in am and plan on discharge when CPK <10K     Transaminitis    As above         VTE Risk Mitigation (From admission, onward)        Ordered     IP VTE LOW RISK PATIENT  Once      01/02/19 6666              Sobeida Stewart MD  Department of Hospital Medicine   [facility]

## 2019-01-09 NOTE — PLAN OF CARE
Problem: Adult Inpatient Plan of Care  Goal: Plan of Care Review  Outcome: Ongoing (interventions implemented as appropriate)   01/09/19 1630   Plan of Care Review   Plan of Care Reviewed With patient   Patient receiving 500 ml of normal saline continuously.will review labs in the am

## 2019-01-09 NOTE — SUBJECTIVE & OBJECTIVE
Interval History: clinically stable. CPK again > 40K but AST is improving and his symptoms are much better.     Review of Systems   Constitutional: Negative for chills and fever.   Respiratory: Negative for shortness of breath.    Cardiovascular: Negative for chest pain.   Musculoskeletal: Positive for myalgias (much better).     Objective:     Vital Signs (Most Recent):  Temp: 98.5 °F (36.9 °C) (01/09/19 0742)  Pulse: (!) 56 (01/09/19 0742)  Resp: 19 (01/09/19 0742)  BP: (!) 140/70 (01/09/19 0742)  SpO2: 99 % (01/09/19 0742) Vital Signs (24h Range):  Temp:  [98.1 °F (36.7 °C)-98.5 °F (36.9 °C)] 98.5 °F (36.9 °C)  Pulse:  [56-68] 56  Resp:  [17-19] 19  SpO2:  [97 %-99 %] 99 %  BP: (124-140)/(58-74) 140/70     Weight: 64 kg (141 lb 2 oz)  Body mass index is 19.14 kg/m².    Intake/Output Summary (Last 24 hours) at 1/9/2019 0821  Last data filed at 1/9/2019 0500  Gross per 24 hour   Intake 360 ml   Output 6250 ml   Net -5890 ml      Physical Exam   Constitutional: He is oriented to person, place, and time. He appears well-developed and well-nourished. No distress.   HENT:   Head: Normocephalic and atraumatic.   Eyes: EOM are normal. Pupils are equal, round, and reactive to light. Right eye exhibits no discharge. Left eye exhibits no discharge.   Neck: Normal range of motion.   Cardiovascular: Normal rate and regular rhythm. Exam reveals no gallop.   Pulmonary/Chest: Effort normal and breath sounds normal.   Abdominal: Soft. Bowel sounds are normal. He exhibits no distension. There is no tenderness. There is no rebound and no guarding.   Musculoskeletal: Normal range of motion. He exhibits no edema.   Neurological: He is alert and oriented to person, place, and time.   Skin: Skin is warm and dry. Capillary refill takes less than 2 seconds. He is not diaphoretic. No erythema.   Psychiatric: He has a normal mood and affect. His behavior is normal. Judgment and thought content normal.   Nursing note and vitals  reviewed.      Significant Labs: All pertinent labs within the past 24 hours have been reviewed.    Significant Imaging: I have reviewed and interpreted all pertinent imaging results/findings within the past 24 hours.

## 2019-01-10 LAB
ALBUMIN SERPL BCP-MCNC: 3 G/DL
ALP SERPL-CCNC: 53 U/L
ALT SERPL W/O P-5'-P-CCNC: 318 U/L
ANION GAP SERPL CALC-SCNC: 8 MMOL/L
AST SERPL-CCNC: 129 U/L
BILIRUB SERPL-MCNC: 0.3 MG/DL
BUN SERPL-MCNC: 7 MG/DL
CALCIUM SERPL-MCNC: 8.7 MG/DL
CHLORIDE SERPL-SCNC: 109 MMOL/L
CK SERPL-CCNC: ABNORMAL U/L
CO2 SERPL-SCNC: 23 MMOL/L
CREAT SERPL-MCNC: 0.8 MG/DL
EST. GFR  (AFRICAN AMERICAN): >60 ML/MIN/1.73 M^2
EST. GFR  (NON AFRICAN AMERICAN): >60 ML/MIN/1.73 M^2
GLUCOSE SERPL-MCNC: 85 MG/DL
POTASSIUM SERPL-SCNC: 3.6 MMOL/L
PROT SERPL-MCNC: 5.9 G/DL
SODIUM SERPL-SCNC: 140 MMOL/L

## 2019-01-10 PROCEDURE — 11000001 HC ACUTE MED/SURG PRIVATE ROOM

## 2019-01-10 PROCEDURE — 80053 COMPREHEN METABOLIC PANEL: CPT

## 2019-01-10 PROCEDURE — 82550 ASSAY OF CK (CPK): CPT

## 2019-01-10 PROCEDURE — 36415 COLL VENOUS BLD VENIPUNCTURE: CPT

## 2019-01-10 NOTE — ASSESSMENT & PLAN NOTE
Patient reported recent strenuous work-outs as likely cause of rhabdomyolysis.    CPK > 40K along with a dipstick with 3+ occult blood. Renal function stable  He had elevation in his AST/ALT of 1770/457 likely caused by rhabdomyolysis as well  Hepatitis panel negative and Utox negative  Continue aggressive IV fluids and monitor renal function  I expect further improvement by tomorrow  He is otherwise doing well. Is independent and ambulatory  Pt tolerating IVF to 500 ml/hr (from 250, then 350 ml/hr) since 1/4/19.  Repeat labs in am. I expect meeting goal by tomorrow

## 2019-01-10 NOTE — NURSING
IVF infusing , IV intact checked patency blood flow noted patient states he is having no pain or discomfort at this time . Will continue care

## 2019-01-10 NOTE — NURSING
Report recd care assumed . AAOx4 , IVF infusing to LUE , IV intact blood flow noted . Patient voices no complaints of pain or distress at this time. Will continue care .

## 2019-01-10 NOTE — PROGRESS NOTES
[facility] Hospital Medicine  Progress Note    Patient Name: Edwin Rodriguez  MRN: [id number]  Patient Class: IP- Inpatient   Admission Date: 1/2/2019  Length of Stay: 8 days  Attending Physician: Sobeida Umanzor MD  Primary Care Provider: Primary Doctor No        Subjective:     Principal Problem:Non-traumatic rhabdomyolysis    HPI:  Mr. Edwin Rodriguez is a 23 y.o. male with no medical history who presents to [facility] ED with complaints of hematuria today.  He woke up this morning and noticed that his urine had some blood in there.  [place] [facility] [place] to come to the ED for evaluation.  He does say that his proximal arm and distal leg muscles have been very sore and [place]  He reports running a couple days ago and had a light jog (200 meters) yesterday.  He did some exercise this morning around 5:00 AM but otherwise did not do anything out of the ordinary for him.  He generally does not have any exercise intolerance.  He denies any fevers, chills, nausea, vomiting, abdominal pain, dysuria, hematochezia, nor melena.  He does take vitamin supplements.    Hospital Course:  Mr. Rodriguez was admitted with rhabdomyolysis induced by strenuous activity with CPK level >40K and elevated LFTS. Pt was treated with aggressive IVF and close monitoring of renal function and LFTs, and CPK levels were monitored. Acute hepatitis panel was negative and Utox was negative.    Interval History: clinically stable. No events. Labwork showing significant improvement.     Review of Systems   Constitutional: Negative for chills and fever.   Respiratory: Negative for shortness of breath.    Cardiovascular: Negative for chest pain.   Musculoskeletal: Negative for myalgias.     Objective:     Vital Signs (Most Recent):  Temp: 98.1 °F (36.7 °C) (01/10/19 0804)  Pulse: (!) 52 (01/10/19 0804)  Resp: 18 (01/10/19 0804)  BP: 126/62 (01/10/19 0804)  SpO2: 98 %  (01/10/19 0804) Vital Signs (24h Range):  Temp:  [96.5 °F (35.8 °C)-98.3 °F (36.8 °C)] 98.1 °F (36.7 °C)  Pulse:  [52-67] 52  Resp:  [18] 18  SpO2:  [98 %-100 %] 98 %  BP: (124-139)/(60-99) 126/62     Weight: 64 kg (141 lb 2 oz)  Body mass index is 19.14 kg/m².    Intake/Output Summary (Last 24 hours) at 1/10/2019 0817  Last data filed at 1/10/2019 0813  Gross per 24 hour   Intake 600 ml   Output 56526 ml   Net -30208 ml      Physical Exam   Constitutional: He is oriented to person, place, and time. He appears well-developed and well-nourished. No distress.   HENT:   Head: Normocephalic and atraumatic.   Eyes: EOM are normal. Pupils are equal, round, and reactive to light. Right eye exhibits no discharge. Left eye exhibits no discharge.   Neck: Normal range of motion.   Cardiovascular: Normal rate and regular rhythm. Exam reveals no gallop.   Pulmonary/Chest: Effort normal and breath sounds normal.   Abdominal: Soft. Bowel sounds are normal. He exhibits no distension. There is no tenderness. There is no rebound and no guarding.   Musculoskeletal: Normal range of motion. He exhibits no edema.   Neurological: He is alert and oriented to person, place, and time.   Skin: Skin is warm and dry. Capillary refill takes less than 2 seconds. He is not diaphoretic. No erythema.   Psychiatric: He has a normal mood and affect. His behavior is normal. Judgment and thought content normal.   Nursing note and vitals reviewed.      Significant Labs: All pertinent labs within the past 24 hours have been reviewed.    Significant Imaging: I have reviewed and interpreted all pertinent imaging results/findings within the past 24 hours.    Assessment/Plan:      * Non-traumatic rhabdomyolysis    Patient reported recent strenuous work-outs as likely cause of rhabdomyolysis.    CPK > 40K along with a dipstick with 3+ occult blood. Renal function stable  He had elevation in his AST/ALT of 1770/457 likely caused by rhabdomyolysis as  well  Hepatitis panel negative and Utox negative  Continue aggressive IV fluids and monitor renal function  I expect further improvement by tomorrow  He is otherwise doing well. Is independent and ambulatory  Pt tolerating IVF to 500 ml/hr (from 250, then 350 ml/hr) since 1/4/19.  Repeat labs in am. I expect meeting goal by tomorrow     Transaminitis    As above         VTE Risk Mitigation (From admission, onward)        Ordered     IP VTE LOW RISK PATIENT  Once      01/02/19 5793              Sobeida Stewart MD  Department of Hospital Medicine   [facility]

## 2019-01-10 NOTE — SUBJECTIVE & OBJECTIVE
Interval History: clinically stable. No events. Labwork showing significant improvement.     Review of Systems   Constitutional: Negative for chills and fever.   Respiratory: Negative for shortness of breath.    Cardiovascular: Negative for chest pain.   Musculoskeletal: Negative for myalgias.     Objective:     Vital Signs (Most Recent):  Temp: 98.1 °F (36.7 °C) (01/10/19 0804)  Pulse: (!) 52 (01/10/19 0804)  Resp: 18 (01/10/19 0804)  BP: 126/62 (01/10/19 0804)  SpO2: 98 % (01/10/19 0804) Vital Signs (24h Range):  Temp:  [96.5 °F (35.8 °C)-98.3 °F (36.8 °C)] 98.1 °F (36.7 °C)  Pulse:  [52-67] 52  Resp:  [18] 18  SpO2:  [98 %-100 %] 98 %  BP: (124-139)/(60-99) 126/62     Weight: 64 kg (141 lb 2 oz)  Body mass index is 19.14 kg/m².    Intake/Output Summary (Last 24 hours) at 1/10/2019 0817  Last data filed at 1/10/2019 0813  Gross per 24 hour   Intake 600 ml   Output 24359 ml   Net -52001 ml      Physical Exam   Constitutional: He is oriented to person, place, and time. He appears well-developed and well-nourished. No distress.   HENT:   Head: Normocephalic and atraumatic.   Eyes: EOM are normal. Pupils are equal, round, and reactive to light. Right eye exhibits no discharge. Left eye exhibits no discharge.   Neck: Normal range of motion.   Cardiovascular: Normal rate and regular rhythm. Exam reveals no gallop.   Pulmonary/Chest: Effort normal and breath sounds normal.   Abdominal: Soft. Bowel sounds are normal. He exhibits no distension. There is no tenderness. There is no rebound and no guarding.   Musculoskeletal: Normal range of motion. He exhibits no edema.   Neurological: He is alert and oriented to person, place, and time.   Skin: Skin is warm and dry. Capillary refill takes less than 2 seconds. He is not diaphoretic. No erythema.   Psychiatric: He has a normal mood and affect. His behavior is normal. Judgment and thought content normal.   Nursing note and vitals reviewed.      Significant Labs: All pertinent  labs within the past 24 hours have been reviewed.    Significant Imaging: I have reviewed and interpreted all pertinent imaging results/findings within the past 24 hours.

## 2019-01-11 VITALS
OXYGEN SATURATION: 100 % | HEIGHT: 72 IN | HEART RATE: 61 BPM | RESPIRATION RATE: 18 BRPM | DIASTOLIC BLOOD PRESSURE: 78 MMHG | WEIGHT: 141.13 LBS | BODY MASS INDEX: 19.12 KG/M2 | TEMPERATURE: 98 F | SYSTOLIC BLOOD PRESSURE: 119 MMHG

## 2019-01-11 LAB
ALBUMIN SERPL BCP-MCNC: 3.3 G/DL
ALP SERPL-CCNC: 60 U/L
ALT SERPL W/O P-5'-P-CCNC: 279 U/L
ANION GAP SERPL CALC-SCNC: 6 MMOL/L
AST SERPL-CCNC: 89 U/L
BILIRUB SERPL-MCNC: 0.4 MG/DL
BUN SERPL-MCNC: 8 MG/DL
CALCIUM SERPL-MCNC: 9.1 MG/DL
CHLORIDE SERPL-SCNC: 108 MMOL/L
CK SERPL-CCNC: 6310 U/L
CO2 SERPL-SCNC: 27 MMOL/L
CREAT SERPL-MCNC: 0.8 MG/DL
EST. GFR  (AFRICAN AMERICAN): >60 ML/MIN/1.73 M^2
EST. GFR  (NON AFRICAN AMERICAN): >60 ML/MIN/1.73 M^2
GLUCOSE SERPL-MCNC: 75 MG/DL
POTASSIUM SERPL-SCNC: 3.7 MMOL/L
PROT SERPL-MCNC: 6.3 G/DL
SODIUM SERPL-SCNC: 141 MMOL/L

## 2019-01-11 PROCEDURE — 82550 ASSAY OF CK (CPK): CPT

## 2019-01-11 PROCEDURE — 80053 COMPREHEN METABOLIC PANEL: CPT

## 2019-01-11 PROCEDURE — 36415 COLL VENOUS BLD VENIPUNCTURE: CPT

## 2019-01-11 NOTE — DISCHARGE SUMMARY
[facility] Hospital Medicine  Discharge Summary      Patient Name: Edwin oRdriguez  MRN: [id number]  Admission Date: 1/2/2019  Hospital Length of Stay: 9 days  Discharge Date and Time:  01/11/2019 8:36 AM  Attending Physician: Sobeida Umanzor MD   Discharging Provider: Sobeida Stewart MD  Primary Care Provider: Primary Doctor No      HPI:   Mr. Edwin Rodriguez is a 23 y.o. male with no medical history who presents to [facility] ED with complaints of hematuria today.  He woke up this morning and noticed that his urine had some blood in there.  [place] [facility] [place] and was recommended to come to the ED for evaluation.  He does say that his proximal arm and distal leg muscles have been very sore and [place]  He reports running a couple days ago and had a light jog (200 meters) yesterday.  He did some exercise this morning around 5:00 AM but otherwise did not do anything out of the ordinary for him.  He generally does not have any exercise intolerance.  He denies any fevers, chills, nausea, vomiting, abdominal pain, dysuria, hematochezia, nor melena.  He does take vitamin supplements.    * No surgery found *      Hospital Course:   Mr. Rodriguez was admitted with rhabdomyolysis induced by strenuous activity with CPK level >40K and elevated LFTS. Pt was treated with aggressive IVF and close monitoring of renal function and LFTs, and CPK levels were monitored. Acute Hep panel was negative and Utox was negative. Patient's symptoms resolved and liver enzymes (AST 1,770 -> 89;  -> 279) + CPK (>40,000 -> 6,310) improved with IVFs. Renal function remained with normal function. Remained independent, ambulatory and with good appetite. Patient is to f/u at [facility]'s clinic. Advised oral hydration, no heavy lifting or strenuous exercise for at least one week (or as indicated by his PCP). Regular diet. Activity as tolerated.      Final Active Diagnoses:    Diagnosis  Date Noted POA    PRINCIPAL PROBLEM:  Non-traumatic rhabdomyolysis [M62.82] 01/02/2019 Yes    Transaminitis [R74.0] 01/08/2019 Yes      Problems Resolved During this Admission:       Discharged Condition: good    Disposition: Home or Self Care    Follow Up: [facility]'s clinic    Medications:  Reconciled Home Medications:      Medication List      You have not been prescribed any medications.         Indwelling Lines/Drains at time of discharge:   Lines/Drains/Airways          None          Time spent on the discharge of patient: > 45 minutes  Patient was seen and examined on the date of discharge and determined to be suitable for discharge.         Sobeida Stewart MD  Department of Hospital Medicine  [facility]

## 2019-01-11 NOTE — NURSING
aaox4 free of fall or injuries. Denies pain. Iv fluids running @ 500cc/hr. Urine clear yellow. Mother at bedside. Call light within reach, bed in low position.

## 2019-01-11 NOTE — PLAN OF CARE
01/11/19 0842   Final Note   Assessment Type Discharge Planning Assessment   Anticipated Discharge Disposition Home   Hospital Follow Up  Appt(s) scheduled? No   Discharge plans and expectations educations in teach back method with documentation complete? No

## 2020-02-20 ENCOUNTER — HOSPITAL ENCOUNTER (EMERGENCY)
Facility: HOSPITAL | Age: 25
Discharge: HOME | End: 2020-02-20
Attending: INTERNAL MEDICINE
Payer: OTHER GOVERNMENT

## 2020-02-20 VITALS
RESPIRATION RATE: 14 BRPM | WEIGHT: 150 LBS | HEIGHT: 73 IN | HEART RATE: 81 BPM | TEMPERATURE: 98 F | DIASTOLIC BLOOD PRESSURE: 67 MMHG | SYSTOLIC BLOOD PRESSURE: 121 MMHG | BODY MASS INDEX: 19.88 KG/M2 | OXYGEN SATURATION: 100 %

## 2020-02-20 DIAGNOSIS — N50.89 GENITAL LESION, MALE: ICD-10-CM

## 2020-02-20 DIAGNOSIS — Z20.2 STD EXPOSURE: Primary | ICD-10-CM

## 2020-02-20 PROCEDURE — 96372 THER/PROPH/DIAG INJ SC/IM: CPT | Mod: ER

## 2020-02-20 PROCEDURE — 99284 EMERGENCY DEPT VISIT MOD MDM: CPT | Mod: 25,ER

## 2020-02-20 PROCEDURE — 25000003 PHARM REV CODE 250: Mod: ER | Performed by: PHYSICIAN ASSISTANT

## 2020-02-20 PROCEDURE — 63600175 PHARM REV CODE 636 W HCPCS: Mod: ER | Performed by: PHYSICIAN ASSISTANT

## 2020-02-20 PROCEDURE — 87491 CHLMYD TRACH DNA AMP PROBE: CPT

## 2020-02-20 RX ORDER — AZITHROMYCIN 250 MG/1
2000 TABLET, FILM COATED ORAL
Status: COMPLETED | OUTPATIENT
Start: 2020-02-20 | End: 2020-02-20

## 2020-02-20 RX ORDER — ONDANSETRON 4 MG/1
8 TABLET, ORALLY DISINTEGRATING ORAL
Status: COMPLETED | OUTPATIENT
Start: 2020-02-20 | End: 2020-02-20

## 2020-02-20 RX ADMIN — ONDANSETRON 8 MG: 4 TABLET, ORALLY DISINTEGRATING ORAL at 05:02

## 2020-02-20 RX ADMIN — AZITHROMYCIN MONOHYDRATE 2000 MG: 250 TABLET ORAL at 05:02

## 2020-02-20 RX ADMIN — PENICILLIN G BENZATHINE 2.4 MILLION UNITS: 600000 INJECTION, SUSPENSION INTRAMUSCULAR at 05:02

## 2020-02-20 NOTE — ED PROVIDER NOTES
Encounter Date: 2/20/2020    SCRIBE #1 NOTE: I, Jarad Aming, am scribing for, and in the presence of,  MARICEL Jordan. I have scribed the following portions of the note - Other sections scribed: HPI, ROS, PE.       History     Chief Complaint   Patient presents with    Exposure to STD     possible std exposure, states has a small scab in the area and is concerned     24 year old male with a possible exposure to an STD. Patient reports having a small scab on his penis and is concerned.  Denies any sexual partners with diagnosed STI.  He denies any dysuria hematuria, urinary urgency or frequency, penile pain discharge swelling, testicular pain or swelling, abdominal pain, nausea vomiting, fever, chills.  No attempted treatment.    The history is provided by the patient. No  was used.     Review of patient's allergies indicates:  No Known Allergies  Past Medical History:   Diagnosis Date    Rhabdomyolysis 01/02/20.19    nontraumatic     Past Surgical History:   Procedure Laterality Date    NO PAST SURGERIES  01/02/2019     History reviewed. No pertinent family history.  Social History     Tobacco Use    Smoking status: Never Smoker    Smokeless tobacco: Never Used   Substance Use Topics    Alcohol use: Yes     Comment: socially    Drug use: No     Review of Systems   Constitutional: Negative for chills and fever.   HENT: Negative for trouble swallowing.    Eyes: Negative for redness.   Gastrointestinal: Negative for abdominal pain, nausea and vomiting.   Genitourinary: Positive for genital sores. Negative for discharge, dysuria, frequency, hematuria, penile pain, penile swelling, scrotal swelling, testicular pain and urgency.   Musculoskeletal: Negative for back pain and neck pain.   Skin: Negative for rash.   Neurological: Negative for speech difficulty.   Psychiatric/Behavioral: Negative for confusion.   All other systems reviewed and are negative.      Physical Exam     Initial Vitals  [02/20/20 1711]   BP Pulse Resp Temp SpO2   (!) 122/52 (!) 59 17 98.5 °F (36.9 °C) 100 %      MAP       --         Physical Exam    Nursing note and vitals reviewed.  Constitutional: He appears well-developed and well-nourished.   HENT:   Head: Normocephalic and atraumatic.   Right Ear: External ear normal.   Left Ear: External ear normal.   Nose: Nose normal.   Eyes: Conjunctivae are normal.   Neck: Normal range of motion. Neck supple.   Cardiovascular: Normal rate, regular rhythm and normal heart sounds. Exam reveals no gallop and no friction rub.    No murmur heard.  Pulmonary/Chest: Breath sounds normal. No respiratory distress. He has no wheezes. He has no rales.   Abdominal: Soft. He exhibits no distension. There is no tenderness. There is no rebound and no guarding.   Genitourinary:   Genitourinary Comments: Chaperoned by Jarad Smith, scribe:   Small scabbed round lesion to the base of penis. No tenderness to palpation no surrounding erythema, no drainage.    Musculoskeletal: Normal range of motion.   Neurological: He is alert and oriented to person, place, and time.   Skin: Skin is warm and dry.   Psychiatric: He has a normal mood and affect.         ED Course   Procedures  Labs Reviewed   C. TRACHOMATIS/N. GONORRHOEAE BY AMP DNA          Imaging Results    None          Medical Decision Making:   History:   Old Medical Records: I decided to obtain old medical records.  Initial Assessment:   24-year-old male presenting for evaluation of scalp lesion to the base of the penis.  Denies any known STD exposure.  Denies any urinary symptoms. GC ordered and pending.  Will treat patient empirically with 2 g azithromycin for gonorrhea chlamydia and 2.4 of Bicillin for possible syphilis.  Due to painless nonpruritic round sore that may possibly be a chancre. Will have him follow up with STI clinic for full evaluation and management.  Return to ER for worsening symptoms or as needed.            Scribe Attestation:    Scribe #1: I performed the above scribed service and the documentation accurately describes the services I performed. I attest to the accuracy of the note.    Attending Attestation:           Physician Attestation for Scribe:  Physician Attestation Statement for Scribe #1: I, Sheela Pierce PA-C, reviewed documentation, as scribed by Jarad Smith in my presence, and it is both accurate and complete.                               Clinical Impression:     1. STD exposure    2. Genital lesion, male                                Sheela Pierce PA-C  02/20/20 [id number]

## 2020-02-20 NOTE — DISCHARGE INSTRUCTIONS
You were treated for possible Gonorrhea and Chlamydia and Syphilis today. Follow up with STI clinic for full evaluation and management. Return to ER for worsening symptoms or as needed.

## 2020-02-20 NOTE — ED NOTES
"24 y.o. Male to ED with c.o. small "scabs" at base of penis. Patient report she has been wearing condoms but the scabs are on the area the condom does not cover. Patient denies dysuria, denies penile discharge, denies all other symptoms at this time.  "

## 2020-02-22 LAB
C TRACH DNA SPEC QL NAA+PROBE: NOT DETECTED
N GONORRHOEA DNA SPEC QL NAA+PROBE: NOT DETECTED

## 2020-07-28 ENCOUNTER — HOSPITAL ENCOUNTER (EMERGENCY)
Facility: HOSPITAL | Age: 25
Discharge: HOME | End: 2020-07-28
Attending: EMERGENCY MEDICINE
Payer: OTHER GOVERNMENT

## 2020-07-28 VITALS
BODY MASS INDEX: 19.25 KG/M2 | HEIGHT: 74 IN | TEMPERATURE: 98 F | WEIGHT: 150 LBS | OXYGEN SATURATION: 99 % | SYSTOLIC BLOOD PRESSURE: 113 MMHG | HEART RATE: 58 BPM | RESPIRATION RATE: 18 BRPM | DIASTOLIC BLOOD PRESSURE: 80 MMHG

## 2020-07-28 DIAGNOSIS — L98.9 SKIN LESION: Primary | ICD-10-CM

## 2020-07-28 DIAGNOSIS — N48.9 PENILE LESION: ICD-10-CM

## 2020-07-28 LAB
BILIRUBIN, POC UA: NEGATIVE
BLOOD, POC UA: NEGATIVE
CLARITY, POC UA: CLEAR
COLOR, POC UA: YELLOW
GLUCOSE, POC UA: NEGATIVE
KETONES, POC UA: NEGATIVE
LEUKOCYTE EST, POC UA: NEGATIVE
NITRITE, POC UA: NEGATIVE
PH UR STRIP: 7 [PH]
PROTEIN, POC UA: NEGATIVE
SPECIFIC GRAVITY, POC UA: 1.02
UROBILINOGEN, POC UA: 0.2 E.U./DL

## 2020-07-28 PROCEDURE — 87491 CHLMYD TRACH DNA AMP PROBE: CPT

## 2020-07-28 PROCEDURE — 99283 EMERGENCY DEPT VISIT LOW MDM: CPT | Mod: ER

## 2020-07-28 PROCEDURE — 81003 URINALYSIS AUTO W/O SCOPE: CPT | Mod: ER

## 2020-07-28 RX ORDER — NAPROXEN 500 MG/1
500 TABLET ORAL
COMMUNITY
End: 2020-07-28

## 2020-07-28 NOTE — PROVIDER PROGRESS NOTES - EMERGENCY DEPT.
" Emergency Department TeleTRIAGE Encounter Note      CHIEF COMPLAINT    Chief Complaint   Patient presents with    Cyst     states "bump" to middle of pubic mound. states redness and mild swelling. states no drainage.        VITAL SIGNS   Initial Vitals [07/28/20 1843]   BP Pulse Resp Temp SpO2   113/80 (!) 58 18 98.2 °F (36.8 °C) 99 %      MAP       --            ALLERGIES    Review of patient's allergies indicates:  No Known Allergies    PROVIDER TRIAGE NOTE  25 y/o male which presents with penile discharge for the past week. He is concerned he has a STD.       ORDERS  Labs Reviewed   C. TRACHOMATIS/N. GONORRHOEAE BY AMP DNA   POCT URINALYSIS W/O SCOPE       ED Orders (720h ago, onward)    Start Ordered     Status Ordering Provider    07/28/20 1851 07/28/20 1850  POCT URINALYSIS W/O SCOPE  Once      Ordered CHRISTINE HOU    07/28/20 1851 07/28/20 1850  C. trachomatis/N. gonorrhoeae by AMP DNA  Once  Collect    Ordered CHRISTINE HOU            Virtual Visit Note: The provider triage portion of this emergency department evaluation and documentation was performed via [employer], a HIPAA-compliant telemedicine application, in concert with a tele-presenter in the room. A face to face patient evaluation with one of my colleagues will occur once the patient is placed in an emergency department room.      DISCLAIMER: This note was prepared with [employer] voice recognition transcription software. Garbled syntax, mangled pronouns, and other bizarre constructions may be attributed to that software system.  "

## 2020-07-28 NOTE — ED TRIAGE NOTES
Pt reports a single pimple like bump to suprapubic area x 1 week.  Denies any pain or itching.  Denies any drainage.

## 2020-07-28 NOTE — ED PROVIDER NOTES
"Encounter Date: 7/28/2020    SCRIBE #1 NOTE: I, Gladis Connolly, am scribing for, and in the presence of,  Erica Mercado NP. I have scribed the following portions of the note - Other sections scribed: HPI, ROS, PE.       History     Chief Complaint   Patient presents with    Cyst     states "bump" to middle of pubic mound. states redness and mild swelling. states no drainage.      Edwin Rodriguez is a 24 y.o. male who presents to the ED for evaluation of irritation and small bump to the pubic area for 1 week. Denies fever, chills, nausea, vomiting, diarrhea, difficulty urinating, or dysuria. No attempted treatment. Patient has no concerns for any STD's.      The history is provided by the patient. No  was used.     Review of patient's allergies indicates:  No Known Allergies  Past Medical History:   Diagnosis Date    Rhabdomyolysis 01/02/20.19    nontraumatic     Past Surgical History:   Procedure Laterality Date    NO PAST SURGERIES  01/02/2019    TYMPANOSTOMY TUBE PLACEMENT       History reviewed. No pertinent family history.  Social History     Tobacco Use    Smoking status: Never Smoker    Smokeless tobacco: Never Used   Substance Use Topics    Alcohol use: Yes     Comment: socially    Drug use: No     Review of Systems   Constitutional: Negative for chills and fever.   Gastrointestinal: Negative for diarrhea, nausea and vomiting.   Genitourinary: Negative for difficulty urinating and dysuria.   Skin: Positive for wound (bump to pubic area).   All other systems reviewed and are negative.      Physical Exam     Initial Vitals [07/28/20 1843]   BP Pulse Resp Temp SpO2   113/80 (!) 58 18 98.2 °F (36.8 °C) 99 %      MAP       --         Physical Exam    Nursing note and vitals reviewed.  Constitutional: He appears well-developed.   HENT:   Head: Normocephalic.   Right Ear: External ear normal.   Left Ear: External ear normal.   Nose: Nose normal.   Mouth/Throat: Oropharynx is clear and " moist.   Eyes: Conjunctivae and EOM are normal. Pupils are equal, round, and reactive to light.   Neck: Normal range of motion. Neck supple.   Cardiovascular: Normal rate, regular rhythm, S1 normal, S2 normal and normal heart sounds. Exam reveals no gallop and no friction rub.    No murmur heard.  Pulmonary/Chest: Breath sounds normal. No respiratory distress. He has no wheezes. He has no rhonchi. He has no rales.   Abdominal: Soft. There is no abdominal tenderness. There is no CVA tenderness.   Genitourinary:    Testes and penis normal.   Cremasteric reflex is present. Right testis shows no swelling and no tenderness. Left testis shows no swelling and no tenderness.    Genitourinary Comments: Small flesh-colored lesion to the base of the penis.  No tenderness.  No pustular lesions.   exam chaperoned by RN.     Musculoskeletal: Normal range of motion.   Neurological: He is alert and oriented to person, place, and time.   Skin: Skin is warm and dry. Capillary refill takes less than 2 seconds.   Psychiatric: He has a normal mood and affect. His behavior is normal.         ED Course   Procedures  Labs Reviewed   C. TRACHOMATIS/N. GONORRHOEAE BY AMP DNA   POCT URINALYSIS W/O SCOPE   POCT URINALYSIS W/O SCOPE          Imaging Results    None          Medical Decision Making:   History:   Old Medical Records: I decided to obtain old medical records.  Clinical Tests:   Lab Tests: Ordered and Reviewed  ED Management:  24-year-old male presenting ED for penile lesion.  Does not appear to be herpetic or chancre.  He has no other complaints.  Urine without infection.  Gonorrhea chlamydia pending.  Patient will follow up with an STD clinic for further STD testing.    Based on my clinical evaluation, I do not appreciate any immediate, emergent, or life threatening condition or etiology that warrants additional workup today.  I feel the patient can be discharged with close follow-up care.            Scribe Attestation:   Awilda  #1: I performed the above scribed service and the documentation accurately describes the services I performed. I attest to the accuracy of the note.    Based on my clinical evaluation, I do not appreciate any immediate, emergent, or life threatening condition or etiology that warrants additional workup today.  I feel the patient can be discharged with close follow-up care.                      Clinical Impression:     1. Skin lesion    2. Penile lesion            Disposition:   Disposition: Discharged  Condition: Stable     ED Disposition Condition    Discharge Stable        ED Prescriptions     None        Follow-up Information     Follow up With Specialties Details Why Contact Info     [facility]  Schedule an appointment as soon as possible for a visit in 1 day For follow-up if you do not have a primary care doctor---STD testing [address] [place] [address] [phone]      [facility] Emergency Department Emergency Medicine Go to  If symptoms worsen [address] [facility] [address] [phone]

## 2020-07-31 LAB
C TRACH DNA SPEC QL NAA+PROBE: NOT DETECTED
N GONORRHOEA DNA SPEC QL NAA+PROBE: NOT DETECTED

## 2020-10-23 ENCOUNTER — OFFICE VISIT (OUTPATIENT)
Dept: OTOLARYNGOLOGY | Facility: CLINIC | Age: 25
End: 2020-10-23
Payer: OTHER GOVERNMENT

## 2020-10-23 VITALS
HEART RATE: 77 BPM | HEIGHT: 74 IN | WEIGHT: 152 LBS | SYSTOLIC BLOOD PRESSURE: 132 MMHG | BODY MASS INDEX: 19.51 KG/M2 | DIASTOLIC BLOOD PRESSURE: 88 MMHG

## 2020-10-23 DIAGNOSIS — K12.0 APHTHOUS ULCER: ICD-10-CM

## 2020-10-23 DIAGNOSIS — K21.9 GASTROESOPHAGEAL REFLUX DISEASE, UNSPECIFIED WHETHER ESOPHAGITIS PRESENT: ICD-10-CM

## 2020-10-23 DIAGNOSIS — J35.8 TONSIL STONE: Primary | ICD-10-CM

## 2020-10-23 DIAGNOSIS — F95.9: ICD-10-CM

## 2020-10-23 PROCEDURE — 99213 OFFICE O/P EST LOW 20 MIN: CPT | Mod: PBBFAC | Performed by: OTOLARYNGOLOGY

## 2020-10-23 PROCEDURE — 99999 PR PBB SHADOW E&M-EST. PATIENT-LVL III: CPT | Mod: PBBFAC,,, | Performed by: OTOLARYNGOLOGY

## 2020-10-23 PROCEDURE — 99204 OFFICE O/P NEW MOD 45 MIN: CPT | Mod: S$PBB,,, | Performed by: OTOLARYNGOLOGY

## 2020-10-23 PROCEDURE — 99999 PR PBB SHADOW E&M-EST. PATIENT-LVL III: ICD-10-PCS | Mod: PBBFAC,,, | Performed by: OTOLARYNGOLOGY

## 2020-10-23 PROCEDURE — 99204 PR OFFICE/OUTPT VISIT, NEW, LEVL IV, 45-59 MIN: ICD-10-PCS | Mod: S$PBB,,, | Performed by: OTOLARYNGOLOGY

## 2020-10-23 NOTE — PROGRESS NOTES
Head and Neck Surgery Clinic Note    CC: tonsil stones, habitual tongue-sucking, aphthous ulcers, globus, known GERD    HPI: Edwin Rodriguez is a 25 y.o. male presenting with longstanding habitual sucking of the posterior tongue that is disturbing his co-workers. He also has frequent and recurrent tonsil stones, but denies tonsillitis, PTA, or tobacco use. He will occasionally smoke hookah but this is not regular. He gets occasional aphthous ulcers/canker sores and these have not been treated with anything. He gets a globus sensation when lying down at night. He does eat very late at night and has been told he has GERD. He is not on any medications currently.    Past Medical History:   Diagnosis Date    Rhabdomyolysis 01/02/20.19    nontraumatic       Past Surgical History:   Procedure Laterality Date    NO PAST SURGERIES  01/02/2019    TYMPANOSTOMY TUBE PLACEMENT         No current outpatient medications on file.    Review of patient's allergies indicates:  No Known Allergies    History reviewed. No pertinent family history.    Social History     Socioeconomic History    Marital status: Single     Spouse name: Not on file    Number of children: Not on file    Years of education: Not on file    Highest education level: Not on file   Occupational History    Not on file   Social Needs    Financial resource strain: Not on file    Food insecurity     Worry: Not on file     Inability: Not on file    Transportation needs     Medical: Not on file     Non-medical: Not on file   Tobacco Use    Smoking status: Never Smoker    Smokeless tobacco: Never Used   Substance and Sexual Activity    Alcohol use: Yes     Comment: socially    Drug use: No    Sexual activity: Yes     Partners: Female   Lifestyle    Physical activity     Days per week: Not on file     Minutes per session: Not on file    Stress: Not on file   Relationships    Social connections     Talks on phone: Not on file     Gets together: Not on file      Attends Mosque service: Not on file     Active member of club or organization: Not on file     Attends meetings of clubs or organizations: Not on file     Relationship status: Not on file   Other Topics Concern    Not on file   Social History Narrative    Not on file       Review of Systems -  Constitutional: Denies having night sweats, constant fatigue, loss of appetite or recent substantial weight loss.  Eyes: Denies blurred vision or double vision.  Respiratory: Denies symptoms of shortness of breath, noisy breathing, hoarseness or chronic cough.  GI: Denies symptoms of heartburn, acid regurgitation, or the known presence of a hiatal hernia.  The remainder of a 10-point review of systems is negative    PERSONAL REVIEW OF RADIOLOGICAL FILMS AND RECORDS:  Reviewed numerous photos of tonsil stones and aphthous ulcers on his phone    PERSONAL REVIEW OF LABORATORY VALUES:  Noncontributory    PHYSICAL EXAM:  Vitals - There were no vitals taken for this visit.  Constitutional -      General Appearance: well developed, well nourished, without obvious deformities     Communication: speaks with a normal voice without hoarseness  Head & Face -     Overall: no obvious scars, lesions or masses     Parotid and submandibular glands: no masses or tenderness     Facial strength: normal and equal bilaterally  Eyes -      EOM intact  Ear, Nose, Mouth & Throat -     Ears: both left and right external auditory canals and TM's are normal, no external deformities     Nasal exam: mucosa is pink, septum is midline, visible turbinates are normal on anterior rhinoscopy     Mastication: teeth appear in good repair     Oral Cavity and oropharynx: mucosa, hard and soft palates, tongue, posterior pharyngeal wall, lips and gums are without lesions. Tonsils appear 2+ and cryptic  Respiratory:     Breathing unlabored, no stridor  Cardiovascular:     No JVD, capillary refill normal  Larynx: using the mirror for indirect laryngoscopy, the  epiglottic, false cords, true cords, and pyriform sinuses are without lesions and the true vocal cords move normally  Neck: appears symmetric, and on palpation is without masses   Endocrine:     Thyroid: no asymmetry, thyromegaly, or thyroid nodules on palpation  Lymphatic:     No cervical lymphadenopathy  Cranial Nerves:      II: Pupillary reflexes normal     III, IV, VI: EOM normal     V: 1,2,3: normal sensation     VII: Normal strength in all divisions     IX, X: Normal voice, palatal elevation and sensation     XI: Shoulder strength normal       XII: Tongue mobility normal  Psychiatric:     Appropriate affect    ASSESSMENT: tonsil stones, aphthous ulcers, GERD    PLAN: We discussed gargling with saline and rinsing after eating as prevention for tonsil stones. This is not an indication for tonsillectomy. The tongue sucking/movement is a maladaptation, basically a habit he must break voluntarily. Advised he avoid eating within 3 hours of going to bed to prevent GERD.       Trevon Arteaga

## 2021-03-14 ENCOUNTER — HOSPITAL ENCOUNTER (EMERGENCY)
Facility: HOSPITAL | Age: 26
Discharge: HOME | End: 2021-03-14
Attending: EMERGENCY MEDICINE
Payer: OTHER GOVERNMENT

## 2021-03-14 VITALS
WEIGHT: 155 LBS | BODY MASS INDEX: 19.89 KG/M2 | TEMPERATURE: 99 F | DIASTOLIC BLOOD PRESSURE: 57 MMHG | RESPIRATION RATE: 18 BRPM | OXYGEN SATURATION: 97 % | HEART RATE: 76 BPM | SYSTOLIC BLOOD PRESSURE: 114 MMHG | HEIGHT: 74 IN

## 2021-03-14 DIAGNOSIS — J06.9 VIRAL URI: Primary | ICD-10-CM

## 2021-03-14 DIAGNOSIS — R06.2 WHEEZING: ICD-10-CM

## 2021-03-14 DIAGNOSIS — R06.02 SOB (SHORTNESS OF BREATH): ICD-10-CM

## 2021-03-14 DIAGNOSIS — R05.9 COUGH: ICD-10-CM

## 2021-03-14 LAB
CTP QC/QA: YES
SARS-COV-2 RDRP RESP QL NAA+PROBE: NEGATIVE

## 2021-03-14 PROCEDURE — 63600175 PHARM REV CODE 636 W HCPCS: Performed by: PHYSICIAN ASSISTANT

## 2021-03-14 PROCEDURE — 93010 ELECTROCARDIOGRAM REPORT: CPT | Mod: ,,, | Performed by: INTERNAL MEDICINE

## 2021-03-14 PROCEDURE — 93005 ELECTROCARDIOGRAM TRACING: CPT

## 2021-03-14 PROCEDURE — 99284 EMERGENCY DEPT VISIT MOD MDM: CPT | Mod: 25

## 2021-03-14 PROCEDURE — 96372 THER/PROPH/DIAG INJ SC/IM: CPT

## 2021-03-14 PROCEDURE — 93010 EKG 12-LEAD: ICD-10-PCS | Mod: ,,, | Performed by: INTERNAL MEDICINE

## 2021-03-14 PROCEDURE — U0005 INFEC AGEN DETEC AMPLI PROBE: HCPCS | Performed by: PHYSICIAN ASSISTANT

## 2021-03-14 PROCEDURE — U0003 INFECTIOUS AGENT DETECTION BY NUCLEIC ACID (DNA OR RNA); SEVERE ACUTE RESPIRATORY SYNDROME CORONAVIRUS 2 (SARS-COV-2) (CORONAVIRUS DISEASE [COVID-19]), AMPLIFIED PROBE TECHNIQUE, MAKING USE OF HIGH THROUGHPUT TECHNOLOGIES AS DESCRIBED BY CMS-2020-01-R: HCPCS | Performed by: PHYSICIAN ASSISTANT

## 2021-03-14 PROCEDURE — U0002 COVID-19 LAB TEST NON-CDC: HCPCS | Performed by: PHYSICIAN ASSISTANT

## 2021-03-14 RX ORDER — BENZONATATE 100 MG/1
100 CAPSULE ORAL 3 TIMES DAILY PRN
Qty: 20 CAPSULE | Refills: 0 | Status: SHIPPED | OUTPATIENT
Start: 2021-03-14 | End: 2021-03-21

## 2021-03-14 RX ORDER — DEXAMETHASONE SODIUM PHOSPHATE 4 MG/ML
6 INJECTION, SOLUTION INTRA-ARTICULAR; INTRALESIONAL; INTRAMUSCULAR; INTRAVENOUS; SOFT TISSUE
Status: COMPLETED | OUTPATIENT
Start: 2021-03-14 | End: 2021-03-14

## 2021-03-14 RX ORDER — ALBUTEROL SULFATE 90 UG/1
1-2 AEROSOL, METERED RESPIRATORY (INHALATION) EVERY 6 HOURS PRN
Qty: 18 G | Refills: 0 | Status: SHIPPED | OUTPATIENT
Start: 2021-03-14 | End: 2021-04-13

## 2021-03-14 RX ORDER — CETIRIZINE HYDROCHLORIDE 10 MG/1
10 TABLET ORAL DAILY
Qty: 14 TABLET | Refills: 0 | Status: SHIPPED | OUTPATIENT
Start: 2021-03-14 | End: 2021-03-28

## 2021-03-14 RX ORDER — ACETAMINOPHEN 500 MG
500 TABLET ORAL EVERY 4 HOURS PRN
Qty: 20 TABLET | Refills: 0 | Status: SHIPPED | OUTPATIENT
Start: 2021-03-14 | End: 2021-03-19

## 2021-03-14 RX ORDER — IBUPROFEN 600 MG/1
600 TABLET ORAL EVERY 6 HOURS PRN
Qty: 20 TABLET | Refills: 0 | Status: SHIPPED | OUTPATIENT
Start: 2021-03-14 | End: 2021-03-19

## 2021-03-14 RX ORDER — PREDNISONE 20 MG/1
60 TABLET ORAL DAILY
Qty: 15 TABLET | Refills: 0 | Status: SHIPPED | OUTPATIENT
Start: 2021-03-14 | End: 2021-03-19

## 2021-03-14 RX ORDER — FLUTICASONE PROPIONATE 50 MCG
1 SPRAY, SUSPENSION (ML) NASAL 2 TIMES DAILY PRN
Qty: 15 G | Refills: 0 | Status: SHIPPED | OUTPATIENT
Start: 2021-03-14 | End: 2021-04-13

## 2021-03-14 RX ADMIN — DEXAMETHASONE SODIUM PHOSPHATE 6 MG: 4 INJECTION INTRA-ARTICULAR; INTRALESIONAL; INTRAMUSCULAR; INTRAVENOUS; SOFT TISSUE at 12:03

## 2021-03-15 LAB — SARS-COV-2 RNA RESP QL NAA+PROBE: NOT DETECTED

## 2021-04-16 ENCOUNTER — PATIENT MESSAGE (OUTPATIENT)
Dept: RESEARCH | Facility: HOSPITAL | Age: 26
End: 2021-04-16